# Patient Record
Sex: MALE | Race: WHITE | Employment: UNEMPLOYED | URBAN - METROPOLITAN AREA
[De-identification: names, ages, dates, MRNs, and addresses within clinical notes are randomized per-mention and may not be internally consistent; named-entity substitution may affect disease eponyms.]

---

## 2017-01-15 ENCOUNTER — TRANSFERRED RECORDS (OUTPATIENT)
Dept: HEALTH INFORMATION MANAGEMENT | Facility: CLINIC | Age: 72
End: 2017-01-15

## 2017-01-15 ENCOUNTER — HISTORIC RESULTS (OUTPATIENT)
Dept: ADMINISTRATIVE | Age: 72
End: 2017-01-15

## 2017-01-17 ENCOUNTER — HISTORIC RESULTS (OUTPATIENT)
Dept: ADMINISTRATIVE | Age: 72
End: 2017-01-17

## 2020-11-11 ENCOUNTER — HOSPITAL ENCOUNTER (INPATIENT)
Facility: CLINIC | Age: 75
LOS: 4 days | Discharge: HOME | End: 2020-11-15
Attending: EMERGENCY MEDICINE | Admitting: HOSPITALIST
Payer: COMMERCIAL

## 2020-11-11 ENCOUNTER — APPOINTMENT (OUTPATIENT)
Dept: CT IMAGING | Facility: CLINIC | Age: 75
End: 2020-11-11
Attending: EMERGENCY MEDICINE
Payer: COMMERCIAL

## 2020-11-11 ENCOUNTER — TRANSFERRED RECORDS (OUTPATIENT)
Dept: HEALTH INFORMATION MANAGEMENT | Facility: CLINIC | Age: 75
End: 2020-11-11

## 2020-11-11 DIAGNOSIS — J96.01 ACUTE RESPIRATORY FAILURE WITH HYPOXIA (H): ICD-10-CM

## 2020-11-11 DIAGNOSIS — E53.8 VITAMIN B12 DEFICIENCY (NON ANEMIC): Primary | ICD-10-CM

## 2020-11-11 DIAGNOSIS — Z20.822 SUSPECTED COVID-19 VIRUS INFECTION: ICD-10-CM

## 2020-11-11 DIAGNOSIS — I10 BENIGN ESSENTIAL HYPERTENSION: ICD-10-CM

## 2020-11-11 DIAGNOSIS — E53.8 VITAMIN B12 DEFICIENCY: ICD-10-CM

## 2020-11-11 DIAGNOSIS — I48.91 NEW ONSET ATRIAL FIBRILLATION (H): ICD-10-CM

## 2020-11-11 DIAGNOSIS — E87.1 HYPONATREMIA: ICD-10-CM

## 2020-11-11 DIAGNOSIS — J18.9 PNEUMONIA OF BOTH LOWER LOBES DUE TO INFECTIOUS ORGANISM: ICD-10-CM

## 2020-11-11 LAB
ALBUMIN SERPL-MCNC: 3.4 G/DL (ref 3.4–5)
ALBUMIN UR-MCNC: 100 MG/DL
ALP SERPL-CCNC: 146 U/L (ref 40–150)
ALT SERPL W P-5'-P-CCNC: 104 U/L (ref 0–70)
ANION GAP SERPL CALCULATED.3IONS-SCNC: 10 MMOL/L (ref 3–14)
APPEARANCE UR: CLEAR
AST SERPL W P-5'-P-CCNC: 51 U/L (ref 0–45)
BASE EXCESS BLDV CALC-SCNC: 0.5 MMOL/L
BASOPHILS # BLD AUTO: 0 10E9/L (ref 0–0.2)
BASOPHILS NFR BLD AUTO: 0.1 %
BILIRUB SERPL-MCNC: 1.8 MG/DL (ref 0.2–1.3)
BILIRUB UR QL STRIP: NEGATIVE
BUN SERPL-MCNC: 29 MG/DL (ref 7–30)
CALCIUM SERPL-MCNC: 8.8 MG/DL (ref 8.5–10.1)
CHLORIDE SERPL-SCNC: 90 MMOL/L (ref 94–109)
CO2 SERPL-SCNC: 24 MMOL/L (ref 20–32)
COLOR UR AUTO: YELLOW
CREAT SERPL-MCNC: 1.44 MG/DL (ref 0.66–1.25)
CRP SERPL-MCNC: 160 MG/L (ref 0–8)
D DIMER PPP FEU-MCNC: 5 UG/ML FEU (ref 0–0.5)
DIFFERENTIAL METHOD BLD: ABNORMAL
EOSINOPHIL # BLD AUTO: 0 10E9/L (ref 0–0.7)
EOSINOPHIL NFR BLD AUTO: 0 %
ERYTHROCYTE [DISTWIDTH] IN BLOOD BY AUTOMATED COUNT: 12.9 % (ref 10–15)
GFR SERPL CREATININE-BSD FRML MDRD: 47 ML/MIN/{1.73_M2}
GLUCOSE BLDC GLUCOMTR-MCNC: 153 MG/DL (ref 70–99)
GLUCOSE SERPL-MCNC: 177 MG/DL (ref 70–99)
GLUCOSE UR STRIP-MCNC: NEGATIVE MG/DL
HBA1C MFR BLD: 6.6 % (ref 0–5.6)
HCO3 BLDV-SCNC: 25 MMOL/L (ref 21–28)
HCT VFR BLD AUTO: 29.8 % (ref 40–53)
HGB BLD-MCNC: 10.6 G/DL (ref 13.3–17.7)
HGB UR QL STRIP: ABNORMAL
IMM GRANULOCYTES # BLD: 0 10E9/L (ref 0–0.4)
IMM GRANULOCYTES NFR BLD: 0.3 %
INTERPRETATION ECG - MUSE: NORMAL
IRON SATN MFR SERPL: 7 % (ref 15–46)
IRON SERPL-MCNC: 17 UG/DL (ref 35–180)
KETONES UR STRIP-MCNC: NEGATIVE MG/DL
LACTATE BLD-SCNC: 2.1 MMOL/L (ref 0.7–2)
LEUKOCYTE ESTERASE UR QL STRIP: NEGATIVE
LYMPHOCYTES # BLD AUTO: 0.3 10E9/L (ref 0.8–5.3)
LYMPHOCYTES NFR BLD AUTO: 3 %
MCH RBC QN AUTO: 30.3 PG (ref 26.5–33)
MCHC RBC AUTO-ENTMCNC: 35.6 G/DL (ref 31.5–36.5)
MCV RBC AUTO: 85 FL (ref 78–100)
MONOCYTES # BLD AUTO: 0.9 10E9/L (ref 0–1.3)
MONOCYTES NFR BLD AUTO: 7.7 %
MUCOUS THREADS #/AREA URNS LPF: PRESENT /LPF
NEUTROPHILS # BLD AUTO: 10.1 10E9/L (ref 1.6–8.3)
NEUTROPHILS NFR BLD AUTO: 88.9 %
NITRATE UR QL: NEGATIVE
NRBC # BLD AUTO: 0 10*3/UL
NRBC BLD AUTO-RTO: 0 /100
NT-PROBNP SERPL-MCNC: ABNORMAL PG/ML (ref 0–1800)
O2/TOTAL GAS SETTING VFR VENT: ABNORMAL %
PCO2 BLDV: 39 MM HG (ref 40–50)
PH BLDV: 7.42 PH (ref 7.32–7.43)
PH UR STRIP: 5.5 PH (ref 5–7)
PLATELET # BLD AUTO: 270 10E9/L (ref 150–450)
PO2 BLDV: 31 MM HG (ref 25–47)
POTASSIUM SERPL-SCNC: 3.7 MMOL/L (ref 3.4–5.3)
PROCALCITONIN SERPL-MCNC: 0.72 NG/ML
PROT SERPL-MCNC: 7.5 G/DL (ref 6.8–8.8)
RBC # BLD AUTO: 3.5 10E12/L (ref 4.4–5.9)
RBC #/AREA URNS AUTO: 1 /HPF (ref 0–2)
SARS-COV-2 RNA SPEC QL NAA+PROBE: NORMAL
SODIUM SERPL-SCNC: 124 MMOL/L (ref 133–144)
SOURCE: ABNORMAL
SP GR UR STRIP: 1.03 (ref 1–1.03)
SPECIMEN SOURCE: NORMAL
TIBC SERPL-MCNC: 251 UG/DL (ref 240–430)
TROPONIN I SERPL-MCNC: <0.015 UG/L (ref 0–0.04)
TSH SERPL DL<=0.005 MIU/L-ACNC: 2.12 MU/L (ref 0.4–4)
UROBILINOGEN UR STRIP-MCNC: 2 MG/DL (ref 0–2)
WBC # BLD AUTO: 11.4 10E9/L (ref 4–11)
WBC #/AREA URNS AUTO: 1 /HPF (ref 0–5)

## 2020-11-11 PROCEDURE — 83605 ASSAY OF LACTIC ACID: CPT | Performed by: EMERGENCY MEDICINE

## 2020-11-11 PROCEDURE — 81001 URINALYSIS AUTO W/SCOPE: CPT | Performed by: EMERGENCY MEDICINE

## 2020-11-11 PROCEDURE — U0003 INFECTIOUS AGENT DETECTION BY NUCLEIC ACID (DNA OR RNA); SEVERE ACUTE RESPIRATORY SYNDROME CORONAVIRUS 2 (SARS-COV-2) (CORONAVIRUS DISEASE [COVID-19]), AMPLIFIED PROBE TECHNIQUE, MAKING USE OF HIGH THROUGHPUT TECHNOLOGIES AS DESCRIBED BY CMS-2020-01-R: HCPCS | Performed by: EMERGENCY MEDICINE

## 2020-11-11 PROCEDURE — 83880 ASSAY OF NATRIURETIC PEPTIDE: CPT | Performed by: EMERGENCY MEDICINE

## 2020-11-11 PROCEDURE — 83036 HEMOGLOBIN GLYCOSYLATED A1C: CPT | Performed by: EMERGENCY MEDICINE

## 2020-11-11 PROCEDURE — 99285 EMERGENCY DEPT VISIT HI MDM: CPT | Mod: 25

## 2020-11-11 PROCEDURE — 80053 COMPREHEN METABOLIC PANEL: CPT | Performed by: EMERGENCY MEDICINE

## 2020-11-11 PROCEDURE — 250N000011 HC RX IP 250 OP 636: Performed by: EMERGENCY MEDICINE

## 2020-11-11 PROCEDURE — 87086 URINE CULTURE/COLONY COUNT: CPT | Performed by: EMERGENCY MEDICINE

## 2020-11-11 PROCEDURE — 999N001017 HC STATISTIC GLUCOSE BY METER IP

## 2020-11-11 PROCEDURE — 210N000002 HC R&B HEART CARE

## 2020-11-11 PROCEDURE — 86140 C-REACTIVE PROTEIN: CPT | Performed by: EMERGENCY MEDICINE

## 2020-11-11 PROCEDURE — 84145 PROCALCITONIN (PCT): CPT | Performed by: EMERGENCY MEDICINE

## 2020-11-11 PROCEDURE — 250N000013 HC RX MED GY IP 250 OP 250 PS 637: Performed by: EMERGENCY MEDICINE

## 2020-11-11 PROCEDURE — 96375 TX/PRO/DX INJ NEW DRUG ADDON: CPT

## 2020-11-11 PROCEDURE — 258N000003 HC RX IP 258 OP 636: Performed by: EMERGENCY MEDICINE

## 2020-11-11 PROCEDURE — 99223 1ST HOSP IP/OBS HIGH 75: CPT | Mod: AI | Performed by: HOSPITALIST

## 2020-11-11 PROCEDURE — 250N000013 HC RX MED GY IP 250 OP 250 PS 637: Performed by: HOSPITALIST

## 2020-11-11 PROCEDURE — 84443 ASSAY THYROID STIM HORMONE: CPT | Performed by: EMERGENCY MEDICINE

## 2020-11-11 PROCEDURE — 250N000011 HC RX IP 250 OP 636: Performed by: HOSPITALIST

## 2020-11-11 PROCEDURE — 83550 IRON BINDING TEST: CPT | Performed by: EMERGENCY MEDICINE

## 2020-11-11 PROCEDURE — 87040 BLOOD CULTURE FOR BACTERIA: CPT | Performed by: EMERGENCY MEDICINE

## 2020-11-11 PROCEDURE — 250N000009 HC RX 250: Performed by: EMERGENCY MEDICINE

## 2020-11-11 PROCEDURE — 250N000012 HC RX MED GY IP 250 OP 636 PS 637: Performed by: HOSPITALIST

## 2020-11-11 PROCEDURE — 85379 FIBRIN DEGRADATION QUANT: CPT | Performed by: EMERGENCY MEDICINE

## 2020-11-11 PROCEDURE — 85025 COMPLETE CBC W/AUTO DIFF WBC: CPT | Performed by: EMERGENCY MEDICINE

## 2020-11-11 PROCEDURE — 96374 THER/PROPH/DIAG INJ IV PUSH: CPT | Mod: 59

## 2020-11-11 PROCEDURE — 83540 ASSAY OF IRON: CPT | Performed by: EMERGENCY MEDICINE

## 2020-11-11 PROCEDURE — 258N000003 HC RX IP 258 OP 636: Performed by: HOSPITALIST

## 2020-11-11 PROCEDURE — 82803 BLOOD GASES ANY COMBINATION: CPT | Performed by: EMERGENCY MEDICINE

## 2020-11-11 PROCEDURE — 93005 ELECTROCARDIOGRAM TRACING: CPT

## 2020-11-11 PROCEDURE — 84484 ASSAY OF TROPONIN QUANT: CPT | Performed by: EMERGENCY MEDICINE

## 2020-11-11 PROCEDURE — 71275 CT ANGIOGRAPHY CHEST: CPT

## 2020-11-11 RX ORDER — LOVASTATIN 20 MG
40 TABLET ORAL AT BEDTIME
Status: DISCONTINUED | OUTPATIENT
Start: 2020-11-11 | End: 2020-11-11 | Stop reason: CLARIF

## 2020-11-11 RX ORDER — INSULIN GLARGINE 100 [IU]/ML
20 INJECTION, SOLUTION SUBCUTANEOUS AT BEDTIME
COMMUNITY

## 2020-11-11 RX ORDER — ATENOLOL 50 MG/1
50 TABLET ORAL ONCE
Status: DISCONTINUED | OUTPATIENT
Start: 2020-11-11 | End: 2020-11-11

## 2020-11-11 RX ORDER — LIDOCAINE 40 MG/G
CREAM TOPICAL
Status: DISCONTINUED | OUTPATIENT
Start: 2020-11-11 | End: 2020-11-15 | Stop reason: HOSPADM

## 2020-11-11 RX ORDER — POLYETHYLENE GLYCOL 3350 17 G/17G
17 POWDER, FOR SOLUTION ORAL DAILY PRN
Status: DISCONTINUED | OUTPATIENT
Start: 2020-11-11 | End: 2020-11-15 | Stop reason: HOSPADM

## 2020-11-11 RX ORDER — IRBESARTAN 300 MG/1
300 TABLET ORAL DAILY
COMMUNITY

## 2020-11-11 RX ORDER — AZITHROMYCIN 500 MG/1
500 INJECTION, POWDER, LYOPHILIZED, FOR SOLUTION INTRAVENOUS ONCE
Status: DISCONTINUED | OUTPATIENT
Start: 2020-11-11 | End: 2020-11-11

## 2020-11-11 RX ORDER — GLIPIZIDE 10 MG/1
10 TABLET ORAL
COMMUNITY

## 2020-11-11 RX ORDER — GLIPIZIDE 10 MG/1
10 TABLET, FILM COATED, EXTENDED RELEASE ORAL ONCE
Status: COMPLETED | OUTPATIENT
Start: 2020-11-11 | End: 2020-11-11

## 2020-11-11 RX ORDER — ACETAMINOPHEN 325 MG/1
975 TABLET ORAL EVERY 6 HOURS PRN
Status: DISCONTINUED | OUTPATIENT
Start: 2020-11-11 | End: 2020-11-15 | Stop reason: HOSPADM

## 2020-11-11 RX ORDER — IRBESARTAN 150 MG/1
300 TABLET ORAL DAILY
Status: DISCONTINUED | OUTPATIENT
Start: 2020-11-12 | End: 2020-11-15 | Stop reason: HOSPADM

## 2020-11-11 RX ORDER — SODIUM CHLORIDE 9 MG/ML
INJECTION, SOLUTION INTRAVENOUS CONTINUOUS
Status: DISCONTINUED | OUTPATIENT
Start: 2020-11-11 | End: 2020-11-11

## 2020-11-11 RX ORDER — DEXAMETHASONE SODIUM PHOSPHATE 10 MG/ML
8 INJECTION, SOLUTION INTRAMUSCULAR; INTRAVENOUS ONCE
Status: COMPLETED | OUTPATIENT
Start: 2020-11-11 | End: 2020-11-11

## 2020-11-11 RX ORDER — TAMSULOSIN HYDROCHLORIDE 0.4 MG/1
0.8 CAPSULE ORAL AT BEDTIME
Status: DISCONTINUED | OUTPATIENT
Start: 2020-11-11 | End: 2020-11-15 | Stop reason: HOSPADM

## 2020-11-11 RX ORDER — ATORVASTATIN CALCIUM 10 MG/1
10 TABLET, FILM COATED ORAL EVERY EVENING
Status: DISCONTINUED | OUTPATIENT
Start: 2020-11-11 | End: 2020-11-15 | Stop reason: HOSPADM

## 2020-11-11 RX ORDER — ONDANSETRON 2 MG/ML
4 INJECTION INTRAMUSCULAR; INTRAVENOUS EVERY 6 HOURS PRN
Status: DISCONTINUED | OUTPATIENT
Start: 2020-11-11 | End: 2020-11-15 | Stop reason: HOSPADM

## 2020-11-11 RX ORDER — GLIPIZIDE 10 MG/1
10 TABLET ORAL
Status: DISCONTINUED | OUTPATIENT
Start: 2020-11-12 | End: 2020-11-13

## 2020-11-11 RX ORDER — METOPROLOL TARTRATE 1 MG/ML
2.5 INJECTION, SOLUTION INTRAVENOUS EVERY 4 HOURS PRN
Status: DISCONTINUED | OUTPATIENT
Start: 2020-11-11 | End: 2020-11-15 | Stop reason: HOSPADM

## 2020-11-11 RX ORDER — ALBUTEROL SULFATE 90 UG/1
2 AEROSOL, METERED RESPIRATORY (INHALATION) 4 TIMES DAILY
Status: DISCONTINUED | OUTPATIENT
Start: 2020-11-11 | End: 2020-11-15 | Stop reason: HOSPADM

## 2020-11-11 RX ORDER — GLIPIZIDE 5 MG/1
5 TABLET ORAL EVERY EVENING
COMMUNITY

## 2020-11-11 RX ORDER — BISACODYL 10 MG
10 SUPPOSITORY, RECTAL RECTAL DAILY PRN
Status: DISCONTINUED | OUTPATIENT
Start: 2020-11-11 | End: 2020-11-15 | Stop reason: HOSPADM

## 2020-11-11 RX ORDER — DEXTROSE MONOHYDRATE 25 G/50ML
25-50 INJECTION, SOLUTION INTRAVENOUS
Status: DISCONTINUED | OUTPATIENT
Start: 2020-11-11 | End: 2020-11-15 | Stop reason: HOSPADM

## 2020-11-11 RX ORDER — IRBESARTAN 150 MG/1
300 TABLET ORAL ONCE
Status: DISCONTINUED | OUTPATIENT
Start: 2020-11-11 | End: 2020-11-11

## 2020-11-11 RX ORDER — LOVASTATIN 40 MG
40 TABLET ORAL AT BEDTIME
COMMUNITY

## 2020-11-11 RX ORDER — NICOTINE POLACRILEX 4 MG
15-30 LOZENGE BUCCAL
Status: DISCONTINUED | OUTPATIENT
Start: 2020-11-11 | End: 2020-11-15 | Stop reason: HOSPADM

## 2020-11-11 RX ORDER — CHLORTHALIDONE 50 MG/1
50 TABLET ORAL DAILY
Status: ON HOLD | COMMUNITY
End: 2020-11-15

## 2020-11-11 RX ORDER — METOPROLOL TARTRATE 25 MG/1
25 TABLET, FILM COATED ORAL 2 TIMES DAILY
Status: DISCONTINUED | OUTPATIENT
Start: 2020-11-11 | End: 2020-11-12

## 2020-11-11 RX ORDER — HYDRALAZINE HYDROCHLORIDE 20 MG/ML
10 INJECTION INTRAMUSCULAR; INTRAVENOUS
Status: DISCONTINUED | OUTPATIENT
Start: 2020-11-11 | End: 2020-11-15 | Stop reason: HOSPADM

## 2020-11-11 RX ORDER — GLIPIZIDE 5 MG/1
5 TABLET ORAL EVERY EVENING
Status: DISCONTINUED | OUTPATIENT
Start: 2020-11-12 | End: 2020-11-13

## 2020-11-11 RX ORDER — CEFTRIAXONE 1 G/1
1 INJECTION, POWDER, FOR SOLUTION INTRAMUSCULAR; INTRAVENOUS EVERY 24 HOURS
Status: DISCONTINUED | OUTPATIENT
Start: 2020-11-12 | End: 2020-11-15 | Stop reason: HOSPADM

## 2020-11-11 RX ORDER — ONDANSETRON 4 MG/1
4 TABLET, ORALLY DISINTEGRATING ORAL EVERY 6 HOURS PRN
Status: DISCONTINUED | OUTPATIENT
Start: 2020-11-11 | End: 2020-11-15 | Stop reason: HOSPADM

## 2020-11-11 RX ORDER — IOPAMIDOL 755 MG/ML
80 INJECTION, SOLUTION INTRAVASCULAR ONCE
Status: COMPLETED | OUTPATIENT
Start: 2020-11-11 | End: 2020-11-11

## 2020-11-11 RX ORDER — AZITHROMYCIN 250 MG/1
250 TABLET, FILM COATED ORAL DAILY
Status: COMPLETED | OUTPATIENT
Start: 2020-11-12 | End: 2020-11-15

## 2020-11-11 RX ORDER — ATENOLOL 50 MG/1
50 TABLET ORAL DAILY
COMMUNITY

## 2020-11-11 RX ORDER — TAMSULOSIN HYDROCHLORIDE 0.4 MG/1
0.8 CAPSULE ORAL AT BEDTIME
COMMUNITY

## 2020-11-11 RX ORDER — CEFTRIAXONE 2 G/1
2 INJECTION, POWDER, FOR SOLUTION INTRAMUSCULAR; INTRAVENOUS ONCE
Status: COMPLETED | OUTPATIENT
Start: 2020-11-11 | End: 2020-11-11

## 2020-11-11 RX ORDER — ACETAMINOPHEN 500 MG
1000 TABLET ORAL ONCE
Status: COMPLETED | OUTPATIENT
Start: 2020-11-11 | End: 2020-11-11

## 2020-11-11 RX ADMIN — ACETAMINOPHEN 975 MG: 325 TABLET, FILM COATED ORAL at 23:37

## 2020-11-11 RX ADMIN — GLIPIZIDE 10 MG: 10 TABLET, FILM COATED, EXTENDED RELEASE ORAL at 17:02

## 2020-11-11 RX ADMIN — METOPROLOL TARTRATE 25 MG: 25 TABLET, FILM COATED ORAL at 21:56

## 2020-11-11 RX ADMIN — CEFTRIAXONE SODIUM 2 G: 2 INJECTION, POWDER, FOR SOLUTION INTRAMUSCULAR; INTRAVENOUS at 19:50

## 2020-11-11 RX ADMIN — SODIUM CHLORIDE: 9 INJECTION, SOLUTION INTRAVENOUS at 21:56

## 2020-11-11 RX ADMIN — ATORVASTATIN CALCIUM 10 MG: 10 TABLET, FILM COATED ORAL at 21:56

## 2020-11-11 RX ADMIN — SODIUM CHLORIDE 500 ML: 9 INJECTION, SOLUTION INTRAVENOUS at 17:02

## 2020-11-11 RX ADMIN — TAMSULOSIN HYDROCHLORIDE 0.8 MG: 0.4 CAPSULE ORAL at 21:56

## 2020-11-11 RX ADMIN — SODIUM CHLORIDE 100 ML: 9 INJECTION, SOLUTION INTRAVENOUS at 17:11

## 2020-11-11 RX ADMIN — INSULIN GLARGINE 10 UNITS: 100 INJECTION, SOLUTION SUBCUTANEOUS at 22:48

## 2020-11-11 RX ADMIN — IOPAMIDOL 80 ML: 755 INJECTION, SOLUTION INTRAVENOUS at 17:11

## 2020-11-11 RX ADMIN — APIXABAN 5 MG: 5 TABLET, FILM COATED ORAL at 21:56

## 2020-11-11 RX ADMIN — DEXAMETHASONE SODIUM PHOSPHATE 8 MG: 10 INJECTION, SOLUTION INTRAMUSCULAR; INTRAVENOUS at 19:49

## 2020-11-11 RX ADMIN — HYDRALAZINE HYDROCHLORIDE 10 MG: 20 INJECTION INTRAMUSCULAR; INTRAVENOUS at 21:55

## 2020-11-11 RX ADMIN — ACETAMINOPHEN 1000 MG: 500 TABLET, FILM COATED ORAL at 15:30

## 2020-11-11 ASSESSMENT — ENCOUNTER SYMPTOMS
SHORTNESS OF BREATH: 1
VOMITING: 0
NAUSEA: 0
FEVER: 1
DIARRHEA: 0
COUGH: 1
PALPITATIONS: 0
FATIGUE: 1

## 2020-11-11 ASSESSMENT — MIFFLIN-ST. JEOR: SCORE: 1915.6

## 2020-11-11 NOTE — ED NOTES
[facility]  ED Nurse Handoff Report    ED Chief complaint: Shortness of Breath      ED Diagnosis:   Final diagnoses:   None       Code Status: Not addressed, confirm with hospitalist    Allergies:   Allergies   Allergen Reactions     Lisinopril Anaphylaxis and Cough     Simvastatin Anaphylaxis and Difficulty breathing       Patient Story: Pt sent from clinic due to shortness of breath and hypoxia.   Focused Assessment:  Pt sats down to 87% with talking or activity. O2 started at 2L/min per nc and sats remain >92%. Pt alert and oriented, pleasant and cooperative. Denies chest pain. D-dimer and BNP elevated (see labs). CT pending.       Treatments and/or interventions provided: Labs, CT   Labs Ordered and Resulted from Time of ED Arrival Up to the Time of Departure from the ED   CBC WITH PLATELETS DIFFERENTIAL - Abnormal; Notable for the following components:       Result Value    WBC 11.4 (*)     RBC Count 3.50 (*)     Hemoglobin 10.6 (*)     Hematocrit 29.8 (*)     Absolute Neutrophil 10.1 (*)     Absolute Lymphocytes 0.3 (*)     All other components within normal limits   COMPREHENSIVE METABOLIC PANEL - Abnormal; Notable for the following components:    Sodium 124 (*)     Chloride 90 (*)     Glucose 177 (*)     Creatinine 1.44 (*)     GFR Estimate 47 (*)     GFR Estimate If Black 54 (*)     Bilirubin Total 1.8 (*)      (*)     AST 51 (*)     All other components within normal limits   NT PROBNP INPATIENT - Abnormal; Notable for the following components:    N-Terminal Pro BNP Inpatient 11,084 (*)     All other components within normal limits   BLOOD GAS VENOUS - Abnormal; Notable for the following components:    PCO2 Venous 39 (*)     All other components within normal limits   D DIMER QUANTITATIVE - Abnormal; Notable for the following components:    D Dimer 5.0 (*)     All other components within normal limits   CRP INFLAMMATION - Abnormal; Notable for the following components:    CRP  Inflammation 160.0 (*)     All other components within normal limits   LACTIC ACID WHOLE BLOOD - Abnormal; Notable for the following components:    Lactic Acid 2.1 (*)     All other components within normal limits   TROPONIN I   PROCALCITONIN   COVID-19 VIRUS (CORONAVIRUS) BY PCR   ROUTINE UA WITH MICROSCOPIC   PULSE OXIMETRY NURSING   BLOOD CULTURE   BLOOD CULTURE   URINE CULTURE AEROBIC BACTERIAL     CT Chest Pulmonary Embolism w Contrast    (Results Pending)       Patient's response to treatments and/or interventions: Tolerated well    To be done/followed up on inpatient unit:  Continue plan of care (UA still needed at time of note)    Does this patient have any cognitive concerns?: None noted    Activity level - Baseline/Home:  Unknown  Activity Level - Current:   Unknown    Patient's Preferred language: English   Needed?: No    Isolation: None and COVID r/o and special precautions  Infection: Not Applicable  COVID r/o and special precautions  Patient tested for COVID 19 prior to admission: YES  Bariatric?: No    Vital Signs:   Vitals:    11/11/20 1600 11/11/20 1615 11/11/20 1630 11/11/20 1645   BP: (!) 156/85 (!) 170/75 (!) 142/72 (!) 152/63   Pulse: 64 72 59 57   Resp: 29 13 15 22   Temp:       TempSrc:       SpO2: 94% 94% 92% 95%   Weight:       Height:           Cardiac Rhythm:     Was the PSS-3 completed:   Yes  What interventions are required if any?               Family Comments: Not present  OBS brochure/video discussed/provided to patient/family: No              Name of person given brochure if not patient: n/a              Relationship to patient: n/a    For the majority of the shift this patient's behavior was Green.   Behavioral interventions performed were Frequent rounding.    ED NURSE PHONE NUMBER: *[id number]  RECEIVING UNIT ED HANDOFF REVIEW    ED Nurse Handoff Report was reviewed by: Dallin Teixeira RN on November 11, 2020 at 7:56 PM

## 2020-11-11 NOTE — ED PROVIDER NOTES
History   Chief Complaint:    The history is provided by the patient and the EMS personnel.      Dallin Mckeon is a 75 year old male with history of type 2 diabetes, hypertension, obesity, hyperlipidemia, hypertension, CKD stage 2 who presents for evaluation of shortness of breath, fever, and cough and arrives via EMS.  The patient notes that for the past 2-3 days he has had a cough, low enegry and feeling feverish. He notes that his shortness of breath has been progressing and today was getting worse. He states that his wife had then brought him in for evaluation at [facility] urgent care and noted to have a fever of 101.8. They also found that he had new onset atrial fibrillation and hypertension and was transferred here. He denies taking any of his medications today including his antihypertensives and that he last took tylenol around 0530. He reports that he has an extensive caridac history and diabetes. He denies changes in his taste and smell or known COVID exposures as he lives at home with his wife. He denies leg swelling or pain, chest pain, emesis or diarrhea.     Allergies:  Lisinopril   Simvastatin     Medications:   Aspirin 81 mg   Lancets  Lantus  Atenolol   Glipizide   Irbesartan   Metformin   Percocet  Gabapentin   Flomax     Past Medical History:    CKD stage 2   begning prostatic hyperplasia  Type 2 diabetes  Morbid obesity   Hyperlipidemia   Hypertension   ANGELITA on CPAP    Past Surgical History:    CABG  Mohs surgery  Cataract implants bilateral    Family History:    Cataract, father   Diabetes, father   Hypertension, father   Stroke, mother   Cardiovascular disease, mother   Hypertension, mother     Social History:  Smoking status: never smoker  Alcohol use: no  Drug use: no  PCP: JENNIFER Sutton CNP   Presents to the ED via EMS  Up to date on immunization     Review of Systems   Constitutional: Positive for fatigue and fever.   Respiratory: Positive for cough and shortness of breath.   "  Cardiovascular: Negative for chest pain, palpitations and leg swelling.   Gastrointestinal: Negative for diarrhea, nausea and vomiting.   All other systems reviewed and are negative.    Physical Exam     Patient Vitals for the past 24 hrs:   BP Temp Temp src Pulse Resp SpO2 Height Weight   11/11/20 1800 (!) 170/67 -- -- 53 24 97 % -- --   11/11/20 1730 (!) 145/66 -- -- 62 22 97 % -- --   11/11/20 1645 (!) 152/63 -- -- 57 22 95 % -- --   11/11/20 1630 (!) 142/72 -- -- 59 15 92 % -- --   11/11/20 1615 (!) 170/75 -- -- 72 13 94 % -- --   11/11/20 1600 (!) 156/85 -- -- 64 29 94 % -- --   11/11/20 1530 (!) 170/85 -- -- 67 26 96 % -- --   11/11/20 1525 (!) 179/71 -- -- 63 (!) 34 96 % -- --   11/11/20 1512 (!) 212/112 98.8  F (37.1  C) Oral 65 16 96 % 1.778 m (5' 10\") 117.9 kg (260 lb)   11/11/20 1500 -- -- -- -- -- (!) 87 % -- --       Physical Exam  Nursing note and vitals reviewed.  Constitutional:  Labored breathing, high BMI.   HENT:   Head:    Atraumatic.   Mouth/Throat:   Oropharynx is clear and moist. No oropharyngeal exudate.   Eyes:    Pupils are equal, round, and reactive to light.   Neck:    Normal range of motion. Neck supple.      No tracheal deviation present. No thyromegaly present.   Cardiovascular:  Regular rate irregular irregular rhythm. no murmur   Pulmonary/Chest: Bibasilar crackles.   Abdominal:   Soft. Bowel sounds are normal. Exhibits no distension and      no mass. There is no tenderness.      There is no rebound and no guarding.   Musculoskeletal:  Trace pretibial edema bilaterally.  Lymphadenopathy:  No cervical adenopathy.   Neurological:   Alert and oriented to person, place, and time.   Skin:    Skin is warm and dry. No rash noted. No pallor.       Emergency Department Course     ECG:  ECG taken at 1516, ECG read at 1522  Atrial fibrillation  Low voltage QRS  Abnormal ECG  Rate 70 bpm. NH interval * ms. QRS duration 108 ms. QT/QTc 444/479 ms. P-R-T axes * -1 44.    Imaging:  Radiology " findings were communicated with the patient who voiced understanding of the findings.    CT Chest PE:  1. No pulmonary embolism demonstrated.   2. Moderate to severe interstitial and groundglass infiltrates which are nonspecific.   3. Trace pleural fluid bilaterally.     Reading per radiology      Laboratory:  Laboratory findings were communicated with the patient who voiced understanding of the findings.    CBC: WBC 11.4(H), HGB 10.6(L),   CMP: (L), Chloride 90(L), Glucose 177(L), GFR 47(L), Creatinine 1.44(H), total bilirubin 1.8(H), (H), Ast 51(H)  Troponin (Collected 1622): <0.015   BNP: 11,084(H)   VBG: pH: 7.42, PCO2: 39(L), PO2: 31, Bicarbonate: 25, O2 Sat: 2 liters, base excess 0.5   D Dimer: 5.0(H)  Lactic Acid: 2.1(H)   CRP inflammation: 160.0(H)  Procalcitonin: 0.72      Blood cultures: pending     COVID-19 virus Swab PCR: pending      Interventions:  1530 Acetaminophen, 1000 mg, PO  1702 Glipizide, 10 mg, PO  1702 NS 1L IV Bolus   19:49 Dexamethasone 8 mg, IV  19:50 Rocephin, 2 g, IV injection    Azithromycin, 500 mg, IV   Irbesartan, 2 tablets at 250 mg, PO        Emergency Department Course:   Nursing notes and vitals reviewed.  A Nasopharyngeal swab was obtained here in the ED.    1510 I performed an exam of the patient as documented above.     1531 IV was inserted and blood was drawn for laboratory testing, results above.     1712 The patient had a CT PE while in the emergency department, results above.       1823 I personally reviewed the results with the patient and answered all related questions prior to admission.    1826  I spoke to Dr. Marti of the hospitalist service who accepts the patient for admission.      1843 I called the patient's wife and updated her on the patient and his plan of care going forward.     Findings and plan explained to the Patient who consents to admission. Discussed the patient with Dr. Marti , who will admit the patient to a observation bed for  further monitoring, evaluation, and treatment.      Impression & Plan      Medical Decision Making:  Dallin Mckeon is a 75 year old male who presents to the emergency department today for evaluation of shortness of breath. I found the patient to have acute hypoxic respiratory failure with bilateral lower lobe ground glass infiltrates concerning for acute COVID pneumonia and septic. He was covered for community acquired pneumonia with azithromycin, ceftriaxone, and steroids for possible COVID pneumonia. He was keep on supplemental oxygen and given cautious IV fluids due to the congestive heart failure with new onset atrial fibrillation which was rate controlled. He was also found to hyponatremia which is improved with IV fluids. There was no sign of pulmonary embolism on CT. Patient was admitted into the care of the hospitalist Dr. Marti. I spoke with and updated the patient's wife.     Diagnosis:    ICD-10-CM    1. Acute respiratory failure with hypoxia (H)  J96.01 Blood culture     Blood culture   2. Pneumonia of both lower lobes due to infectious organism  J18.9    3. Suspected COVID-19 virus infection  Z20.828    4. New onset atrial fibrillation (H)  I48.91    5. Hyponatremia  E87.1    6. Benign essential hypertension  I10        Disposition:   The patient is admitted into the care of Dr. Marti .     Scribe Disclosure:  I, Leatha Matthews, am serving as a scribe at 2:59 PM on 11/11/2020 to document services personally performed by Sowmya Tse MD based on my observations and the provider's statements to me.  [facility] EMERGENCY DEPARTMENT         Sowmya Tse MD  11/11/20 [id number]

## 2020-11-11 NOTE — PHARMACY-ADMISSION MEDICATION HISTORY
Pharmacy Medication History  Admission medication history interview status for the 11/11/2020  admission is complete. See EPIC admission navigator for prior to admission medications       Medication history sources: Patient  Location of interview: Phone  Medication history source reliability: Good  Adherence assessment: Good    Significant changes made to the medication list:  None      Additional medication history information:   None    Medication reconciliation completed by provider prior to medication history? No    Time spent in this activity: 15 min      Prior to Admission medications    Medication Sig Last Dose Taking? Auth Provider   atenolol (TENORMIN) 50 MG tablet Take 50 mg by mouth daily 11/10/2020 at Unknown time Yes Unknown, Entered By History   chlorthalidone (HYGROTON) 50 MG tablet Take 50 mg by mouth daily 11/10/2020 at Unknown time Yes Unknown, Entered By History   glipiZIDE (GLUCOTROL) 10 MG tablet Take 10 mg by mouth daily before breakfast 11/10/2020 at Unknown time Yes Unknown, Entered By History   glipiZIDE (GLUCOTROL) 5 MG tablet Take 5 mg by mouth every evening 11/10/2020 at Unknown time Yes Unknown, Entered By History   insulin glargine (LANTUS VIAL) 100 UNIT/ML vial Inject 20 Units Subcutaneous At Bedtime 11/10/2020 at Unknown time Yes Unknown, Entered By History   irbesartan (AVAPRO) 300 MG tablet Take 300 mg by mouth daily 11/10/2020 at Unknown time Yes Unknown, Entered By History   lovastatin (MEVACOR) 40 MG tablet Take 40 mg by mouth At Bedtime 11/10/2020 at Unknown time Yes Unknown, Entered By History   metFORMIN (GLUCOPHAGE) 1000 MG tablet Take 1,000 mg by mouth 2 times daily (with meals) 11/10/2020 at Unknown time Yes Unknown, Entered By History   tamsulosin (FLOMAX) 0.4 MG capsule Take 0.8 mg by mouth At Bedtime 11/10/2020 at Unknown time Yes Unknown, Entered By History

## 2020-11-11 NOTE — ED NOTES
Bed: ED02  Expected date: 11/11/20  Expected time:   Means of arrival:   Comments:  [place] - 75 M weakness SOB eta 1795

## 2020-11-12 ENCOUNTER — APPOINTMENT (OUTPATIENT)
Dept: OCCUPATIONAL THERAPY | Facility: CLINIC | Age: 75
End: 2020-11-12
Attending: HOSPITALIST
Payer: COMMERCIAL

## 2020-11-12 LAB
ANION GAP SERPL CALCULATED.3IONS-SCNC: 6 MMOL/L (ref 3–14)
ANION GAP SERPL CALCULATED.3IONS-SCNC: 8 MMOL/L (ref 3–14)
BACTERIA SPEC CULT: NO GROWTH
BASOPHILS # BLD AUTO: 0 10E9/L (ref 0–0.2)
BASOPHILS NFR BLD AUTO: 0 %
BUN SERPL-MCNC: 29 MG/DL (ref 7–30)
BUN SERPL-MCNC: 31 MG/DL (ref 7–30)
CALCIUM SERPL-MCNC: 8.5 MG/DL (ref 8.5–10.1)
CALCIUM SERPL-MCNC: 8.8 MG/DL (ref 8.5–10.1)
CHLORIDE SERPL-SCNC: 89 MMOL/L (ref 94–109)
CHLORIDE SERPL-SCNC: 90 MMOL/L (ref 94–109)
CO2 SERPL-SCNC: 25 MMOL/L (ref 20–32)
CO2 SERPL-SCNC: 27 MMOL/L (ref 20–32)
CREAT SERPL-MCNC: 1.22 MG/DL (ref 0.66–1.25)
CREAT SERPL-MCNC: 1.33 MG/DL (ref 0.66–1.25)
DIFFERENTIAL METHOD BLD: ABNORMAL
EOSINOPHIL # BLD AUTO: 0 10E9/L (ref 0–0.7)
EOSINOPHIL NFR BLD AUTO: 0 %
ERYTHROCYTE [DISTWIDTH] IN BLOOD BY AUTOMATED COUNT: 12.9 % (ref 10–15)
FERRITIN SERPL-MCNC: 331 NG/ML (ref 26–388)
GFR SERPL CREATININE-BSD FRML MDRD: 51 ML/MIN/{1.73_M2}
GFR SERPL CREATININE-BSD FRML MDRD: 57 ML/MIN/{1.73_M2}
GLUCOSE BLDC GLUCOMTR-MCNC: 175 MG/DL (ref 70–99)
GLUCOSE BLDC GLUCOMTR-MCNC: 236 MG/DL (ref 70–99)
GLUCOSE BLDC GLUCOMTR-MCNC: 268 MG/DL (ref 70–99)
GLUCOSE BLDC GLUCOMTR-MCNC: 276 MG/DL (ref 70–99)
GLUCOSE SERPL-MCNC: 216 MG/DL (ref 70–99)
GLUCOSE SERPL-MCNC: 285 MG/DL (ref 70–99)
HCT VFR BLD AUTO: 28.5 % (ref 40–53)
HGB BLD-MCNC: 9.8 G/DL (ref 13.3–17.7)
IMM GRANULOCYTES # BLD: 0 10E9/L (ref 0–0.4)
IMM GRANULOCYTES NFR BLD: 0.2 %
LABORATORY COMMENT REPORT: NORMAL
LYMPHOCYTES # BLD AUTO: 0.4 10E9/L (ref 0.8–5.3)
LYMPHOCYTES NFR BLD AUTO: 7.4 %
Lab: NORMAL
MCH RBC QN AUTO: 29.3 PG (ref 26.5–33)
MCHC RBC AUTO-ENTMCNC: 34.4 G/DL (ref 31.5–36.5)
MCV RBC AUTO: 85 FL (ref 78–100)
MONOCYTES # BLD AUTO: 0.4 10E9/L (ref 0–1.3)
MONOCYTES NFR BLD AUTO: 7.4 %
NEUTROPHILS # BLD AUTO: 5 10E9/L (ref 1.6–8.3)
NEUTROPHILS NFR BLD AUTO: 85 %
NRBC # BLD AUTO: 0 10*3/UL
NRBC BLD AUTO-RTO: 0 /100
OSMOLALITY UR: 462 MMOL/KG (ref 100–1200)
PLATELET # BLD AUTO: 256 10E9/L (ref 150–450)
POTASSIUM SERPL-SCNC: 3.6 MMOL/L (ref 3.4–5.3)
POTASSIUM SERPL-SCNC: 3.8 MMOL/L (ref 3.4–5.3)
RBC # BLD AUTO: 3.34 10E12/L (ref 4.4–5.9)
SARS-COV-2 RNA SPEC QL NAA+PROBE: NEGATIVE
SODIUM SERPL-SCNC: 122 MMOL/L (ref 133–144)
SODIUM SERPL-SCNC: 123 MMOL/L (ref 133–144)
SODIUM UR-SCNC: 7 MMOL/L
SPECIMEN SOURCE: NORMAL
SPECIMEN SOURCE: NORMAL
WBC # BLD AUTO: 5.9 10E9/L (ref 4–11)

## 2020-11-12 PROCEDURE — 250N000013 HC RX MED GY IP 250 OP 250 PS 637: Performed by: HOSPITALIST

## 2020-11-12 PROCEDURE — 250N000012 HC RX MED GY IP 250 OP 636 PS 637: Performed by: HOSPITALIST

## 2020-11-12 PROCEDURE — 999N001017 HC STATISTIC GLUCOSE BY METER IP

## 2020-11-12 PROCEDURE — 83935 ASSAY OF URINE OSMOLALITY: CPT | Performed by: INTERNAL MEDICINE

## 2020-11-12 PROCEDURE — 97165 OT EVAL LOW COMPLEX 30 MIN: CPT | Mod: GO | Performed by: OCCUPATIONAL THERAPIST

## 2020-11-12 PROCEDURE — 80048 BASIC METABOLIC PNL TOTAL CA: CPT | Performed by: HOSPITALIST

## 2020-11-12 PROCEDURE — 250N000011 HC RX IP 250 OP 636: Performed by: HOSPITALIST

## 2020-11-12 PROCEDURE — 85025 COMPLETE CBC W/AUTO DIFF WBC: CPT | Performed by: HOSPITALIST

## 2020-11-12 PROCEDURE — 80048 BASIC METABOLIC PNL TOTAL CA: CPT | Performed by: INTERNAL MEDICINE

## 2020-11-12 PROCEDURE — 250N000011 HC RX IP 250 OP 636: Performed by: INTERNAL MEDICINE

## 2020-11-12 PROCEDURE — 82728 ASSAY OF FERRITIN: CPT | Performed by: HOSPITALIST

## 2020-11-12 PROCEDURE — 210N000002 HC R&B HEART CARE

## 2020-11-12 PROCEDURE — 84300 ASSAY OF URINE SODIUM: CPT | Performed by: INTERNAL MEDICINE

## 2020-11-12 PROCEDURE — 97535 SELF CARE MNGMENT TRAINING: CPT | Mod: GO | Performed by: OCCUPATIONAL THERAPIST

## 2020-11-12 PROCEDURE — 99233 SBSQ HOSP IP/OBS HIGH 50: CPT | Performed by: INTERNAL MEDICINE

## 2020-11-12 PROCEDURE — 36415 COLL VENOUS BLD VENIPUNCTURE: CPT | Performed by: INTERNAL MEDICINE

## 2020-11-12 PROCEDURE — 250N000012 HC RX MED GY IP 250 OP 636 PS 637: Performed by: INTERNAL MEDICINE

## 2020-11-12 PROCEDURE — 258N000003 HC RX IP 258 OP 636: Performed by: INTERNAL MEDICINE

## 2020-11-12 PROCEDURE — 36415 COLL VENOUS BLD VENIPUNCTURE: CPT | Performed by: HOSPITALIST

## 2020-11-12 PROCEDURE — 250N000013 HC RX MED GY IP 250 OP 250 PS 637: Performed by: INTERNAL MEDICINE

## 2020-11-12 RX ORDER — POTASSIUM CHLORIDE 1.5 G/1.58G
20 POWDER, FOR SOLUTION ORAL 2 TIMES DAILY
Status: COMPLETED | OUTPATIENT
Start: 2020-11-12 | End: 2020-11-13

## 2020-11-12 RX ADMIN — GLIPIZIDE 5 MG: 5 TABLET ORAL at 21:12

## 2020-11-12 RX ADMIN — ATORVASTATIN CALCIUM 10 MG: 10 TABLET, FILM COATED ORAL at 21:12

## 2020-11-12 RX ADMIN — POTASSIUM CHLORIDE 20 MEQ: 1.5 POWDER, FOR SOLUTION ORAL at 21:13

## 2020-11-12 RX ADMIN — AZITHROMYCIN MONOHYDRATE 250 MG: 250 TABLET ORAL at 09:51

## 2020-11-12 RX ADMIN — CEFTRIAXONE SODIUM 1 G: 1 INJECTION, POWDER, FOR SOLUTION INTRAMUSCULAR; INTRAVENOUS at 21:03

## 2020-11-12 RX ADMIN — GLIPIZIDE 10 MG: 10 TABLET ORAL at 06:58

## 2020-11-12 RX ADMIN — INSULIN ASPART 2 UNITS: 100 INJECTION, SOLUTION INTRAVENOUS; SUBCUTANEOUS at 18:00

## 2020-11-12 RX ADMIN — POTASSIUM CHLORIDE 20 MEQ: 1.5 POWDER, FOR SOLUTION ORAL at 14:19

## 2020-11-12 RX ADMIN — ALBUTEROL SULFATE 2 PUFF: 90 AEROSOL, METERED RESPIRATORY (INHALATION) at 18:00

## 2020-11-12 RX ADMIN — INSULIN GLARGINE 15 UNITS: 100 INJECTION, SOLUTION SUBCUTANEOUS at 21:55

## 2020-11-12 RX ADMIN — IRON SUCROSE 200 MG: 20 INJECTION, SOLUTION INTRAVENOUS at 16:05

## 2020-11-12 RX ADMIN — MAGNESIUM SULFATE HEPTAHYDRATE 1 G: 500 INJECTION, SOLUTION INTRAMUSCULAR; INTRAVENOUS at 22:33

## 2020-11-12 RX ADMIN — INSULIN ASPART 3 UNITS: 100 INJECTION, SOLUTION INTRAVENOUS; SUBCUTANEOUS at 13:28

## 2020-11-12 RX ADMIN — MAGNESIUM SULFATE HEPTAHYDRATE 1 G: 500 INJECTION, SOLUTION INTRAMUSCULAR; INTRAVENOUS at 14:19

## 2020-11-12 RX ADMIN — APIXABAN 5 MG: 5 TABLET, FILM COATED ORAL at 21:13

## 2020-11-12 RX ADMIN — TAMSULOSIN HYDROCHLORIDE 0.8 MG: 0.4 CAPSULE ORAL at 21:53

## 2020-11-12 RX ADMIN — ALBUTEROL SULFATE 2 PUFF: 90 AEROSOL, METERED RESPIRATORY (INHALATION) at 13:33

## 2020-11-12 RX ADMIN — APIXABAN 5 MG: 5 TABLET, FILM COATED ORAL at 09:51

## 2020-11-12 RX ADMIN — IRBESARTAN 300 MG: 150 TABLET ORAL at 09:51

## 2020-11-12 RX ADMIN — ALBUTEROL SULFATE 2 PUFF: 90 AEROSOL, METERED RESPIRATORY (INHALATION) at 21:14

## 2020-11-12 RX ADMIN — SODIUM CHLORIDE 500 ML: 9 INJECTION, SOLUTION INTRAVENOUS at 12:43

## 2020-11-12 ASSESSMENT — ACTIVITIES OF DAILY LIVING (ADL)
ADLS_ACUITY_SCORE: 17
ADLS_ACUITY_SCORE: 20
ADLS_ACUITY_SCORE: 17
ADLS_ACUITY_SCORE: 19
ADLS_ACUITY_SCORE: 17
ADLS_ACUITY_SCORE: 16

## 2020-11-12 ASSESSMENT — MIFFLIN-ST. JEOR: SCORE: 2014.49

## 2020-11-12 NOTE — CONSULTS
"Consult Date:  11/12/2020      REQUESTING PHYSICIAN:  Dr. Pfeiffer.      CONSULTANT:  Darrell Palmer MD.      REASON FOR CONSULTATION:  Hyponatremia.      HISTORY OF PRESENT ILLNESS:  This is a 75-year-old who has a history of mild chronic kidney disease, stage 2/3.  He was admitted on 11/11 for rule out COVID-19.  He complained of cough, fatigue, myalgias and fevers and a CT scan showed possible bilateral ground-glass infiltrates.  He was treated with antibiotics and Decadron.  A CT scan of the chest was done, as noted above, with IV contrast.  He had new onset of AFib.  He had no loss of taste or smell and no nausea, vomiting or diarrhea.  He tells me he was eating relatively well.  His sodium was noted to be 124 and then dropped to 122 today.  Prior to admission, he was on irbesartan, chlorthalidone, atenolol, metformin, insulin and Flomax.  His other medications are as listed.      Today, he feels improved.  He is not on oxygen.  He feels like he is getting enough air.  He has no chest or abdominal pain.  He is not very mobile, but he is able to sit up in bed and sit in a chair.  He does not have any nausea, vomiting, diarrhea, or abdominal pain.      He tells me he has a history of hyponatremia and has been on \"salt pills\" in the past.  He has no history of hypothyroidism.      PAST MEDICAL HISTORY:  Includes diabetes, hypertension, obstructive sleep apnea on CPAP, BPH and obesity.  His treatment in the hospital so far has been as noted above with saline infusion at low level and antibiotics.  He also got some Decadron.      CURRENT MEDICATIONS:  Include:   1.  Apixaban.     2.  Zithromax.     3.  Rocephin.     4.  Irbesartan.     5.  Metoprolol.      SOCIAL HISTORY:  He is .  He is retired.  He lives in [facility].  He does not smoke.      FAMILY HISTORY:  Noncontributory.      REVIEW OF SYSTEMS:  Negative except as noted above.      PHYSICAL EXAMINATION:   VITAL SIGNS:  His weight is 127.8 " kilograms today.  Pulse is 61, blood pressure 139/70, respiratory rate 18.  Urine output so far today 200 mL.   GENERAL:  He is obese.  He is alert and responsive.  He moves easily around the bed.   LUNGS:  Show clear breath sounds.  I did not hear any rales or wheezes.   CARDIAC:  Regular rhythm, normal S1, S2, no murmur.   ABDOMEN:  Obese.   EXTREMITIES:  Show normal nails, joints and pulses in the upper extremities.  Lower extremities show no edema, but diminished pulses in the feet.   HEENT:  Head shows no trauma.  The eyes show pupils round and reactive to light.  He is wearing a mask.  He is not on oxygen.      LABORATORY EVALUATIONS:  today, his sodium is 122, but this is corrected to 124 for high glucose.  Potassium 3.8, bicarbonate 27, creatinine 1.33.  His urine sodium was very low at 7.  His urine osmolality was high at 462.  Urinalysis showed a specific gravity 1.027.  He had 100 of protein, trace blood, 1 white cell, 1 red cell.  His TSH was normal at 2.12.  His CT scan showed normal kidneys bilaterally.      IMPRESSION:   1.  Chronic kidney disease, stage 2/3.  This is associated with diabetes, hypertension and obesity.  He did get IV contrast with a CT scan.  I do not see any other nephrotoxins.  We will follow his renal function closely.   2.  Hyponatremia.  This is apparently a chronic condition per the patient.  His current sodium is 124.  We will replace his potassium and magnesium.  We will give him another small bolus of normal saline at 500 mL.  We will check laboratories again in the morning including a plasma osmolality.  His thyroid function tests is normal.  His creatinine test is mildly elevated, but not enough to cause hyponatremia on its own.  He has a low urine sodium with a high urine osmolality and this could be consistent with simple prerenal azotemia.  Obviously preadmission thiazide diuretic contributes to his hyponatremia.  We will stop this.   3.  Anemia.  He is iron deficient.   We will give him Venofer and we will check B12 and folate levels.         UNIQUE ROY MD             D: 2020   T: 2020   MT: HALINA      Name:     JOY ERICKSON   MRN:      [id number]        Account:       [id number]   :      1945           Consult Date:  2020      Document: [id number]

## 2020-11-12 NOTE — PROVIDER NOTIFICATION
On call hospitalist contacted about pt's increased O2 needs and whether he needs to be on the IV fluids. MD to discharge fluids and recheck labs earlier.

## 2020-11-12 NOTE — PROGRESS NOTES
[facility]    Medicine Progress Note - Hospitalist Service       Date of Admission:  11/11/2020  Assessment & Plan       Dallin Mckeon is a 75 year old male with medical history significant for DM 2, HTN, HL, ANGELITA, BPH, CKD stage II, morbid obesity was sent to the ER from urgent care for evaluation of above symptoms.  Patient was febrile and hypoxic and is being admitted on 11/11/2020 for possible COVID-19/pneumonia.     Bilateral pneumonia  R/O COVID-19 PNA  Hypoxic respiratory failure  Patient with dry cough, myalgia, fatigue, dyspnea and hypoxia and fever of 101.8.  CT chest shows bilateral groundglass opacity.  Highly concerning for COVID-19.  His procalcitonin is also mildly elevated at 0.72.  Mild leukocytosis -WBC 11.4.  Inflammatory markers including CRP as well as LFTs elevated. Lactic acid borderline 2.1.  - started on ceftriaxone/Zithromax on admission; also received Decadron on admission given concern for COVID-19 infection  - proBNP elevated on admission 67432 but he does not look overt fluid overload;  - Echo pending  - supplemental O2, IS, tylenol prn  - doing better today, SOB improved, afebrile, weaned off O2 now  - WBCs down to 5.9  - Covid-19 negative; continue special precautions for now; re-test COVID-19 swab in am  - hold off further steroids or Remdesivir for now  - PT/OT    Hyponatremia  - on admission Na 124- likely multifactorial- related to PTA chlorthalidone, prerenal azotemia  - has h/p hyponatremia  - TSH 2.12, urine osm 462, urine Na 7  - received some iv fluids since admission; PTA diuretic held  - serial Na 123--122  - Nephology consult appreciated  - a bolus NS 500cc ordered  - currently on fluid restriction 1500cc/day  - repeat BMP in am     Atrial fibrillation, new onset  New onset A. fib.  Rate controlled and in fact in 50s-60s.  Denies palpitation, syncope.  Chads 2 vascular score of minimum 5 (age-2, HTN-1, DM-1, CAD-1) pending 2D echo.  Annual risk of  CVA 7.2%.   - TSH OK  - Echo pending  - PTA Atenolol stopped and started on Metoprolol 25 mg po BID; HR was low in am in mid 40's; decrease Metoprolol to 12.5 mg po BID with holding parameters  - echo pending  - Tele- Afib with HR in 50's  - started on Apixaban  - pharm liaison consult for med coverage   - follow up with Cardiology as outpatient    Hyperlipidemia, Hypertension   [PTA on Atenolol 50 mg po daily, Avapro 300 mg po daily, chlorthalidone 50 mg po daily and Lovastatin 40 mg po daily]  - PTA Atenolol switched to Metoprolol, continue PTA Avapro, PTA diuretic held because of hyponatremia  - hydralazine iv prn   - resumed PTA statin  - monitor BP    Anemia, acute on chronic  His hemoglobin was 12.4 last year.  Presents with hemoglobin of 10.6--9.8 this am;  Denies hematochezia or melena.  - He will be on anticoagulation for A. Fib.  - iron studies- iron 17, , TY 7  - venofer iv daily ordered by Nephrology  - Screening colonoscopy/EGD if found to be iron deficiency given that he will be on anticoagulation now.     TESS on CKD stage 2   Noted his creatinine is 1.1-1.2 earlier this year.  He presents with a creatinine of 1.4.  Suspect hypertensive/diabetic nephropathy.  He is on ARB.  Unfortunately has gotten contrast for CT PE study in ER.  - started on mild iv hydration  - PTA diuretic held  - cr 1.22--1.33  - avoid nephrotoxic drugs  - BMP in am  - Monitor for fluid overload  - Follow intake and output, daily weight.     Mild transaminitis  No abdominal pain.  Denies heavy alcohol use.  Could be due to underlying viral infection.  - Monitor (cephalosporin can worsen it)  - If abdominal pain or worsened LFTs, will check hepatitis virus panel and also right upper quadrant ultrasound.     BPH  - Continue PTA Flomax, monitor for urinary retention.     DM type 2  [PTA on Glipizide 10 mg qam and 6 mg qpm, Metformin 1000 mg po BID and Lantus 20 units at bedtime]  - HbA1c 6.6  - resumed PTA Glipizide; PTA  "Metformin held on admission   - also resumed PTA Lantus at a lower dose 10 units at bedtime; --236; increase lantus to 15 units at bedtime; he received iv steroids yesterday which may contribute to elevated BSs  - Moderate insulin resistance sliding scale.  - diabetic diet     ANGELITA on CPAP  Morbid obesity   - Resume CPAP per home setting  -Weight management as outpatient per PCP      Diet:  Moderate carbohydrate  DVT Prophylaxis: Apixaban  Renee Catheter: not present  Code Status:  Full code, discussed with patient  Rule Out COVID-19 Handoff: first COVID-19 test negative, continue special precautions for now, repeat COVID-19 in am          Disposition Plan   Expected discharge: 1-2 days, recommended to prior living arrangement once with home care? clinically improved, Na improved.  Entered: Aaliyah Pfeiffer MD 11/12/2020, 4:17 PM       The patient's care was discussed with the Bedside Nurse and Patient.    Aaliyah Pfeiffer MD  Hospitalist Service  [facility]  Contact information available via [facility] Paging/Directory    ______________________________________________________________________    Interval History   When I entered the room- the patient was frustrated about the iv pump beeping continuously; he said that he feels better, SOB improved; denies chest pain. No N/V, no abd pain; initially he said that he wanted to leave because \"nobody responded to the call button\" but I explained to him that it's better to stay overnight, to continue with ABX and monitor Na level.    Data reviewed today: I reviewed all medications, new labs and imaging results over the last 24 hours. I personally reviewed the chest CT image(s) showing as above..    Physical Exam   Vital Signs: Temp: 97.6  F (36.4  C) Temp src: Oral BP: (!) 165/66 Pulse: 75   Resp: (!) 106 SpO2: 94 % O2 Device: None (Room air) Oxygen Delivery: 2 LPM  Weight: 281 lbs 12.8 oz  Constitutional:             awake, alert, " NAD.   HENT:                         Normocephalic, atraumatic, PERRL  Neck:                           Normal range of motion. Neck supple.                                       No tracheal deviation present. No thyromegaly present.   Cardiovascular:           irregularly irregular, mild bradycardia, no murmurs, no rubs  Pulmonary/Chest:       bilateral air entry, no wheezing, no rales, no crackles.   Abdominal:                  Soft, obese, nonT, BS present. There is no rebound and no guarding.   Musculoskeletal:         No joint deformities:.   Lymphadenopathy:     No cervical adenopathy.   Neurological:               Alert and oriented to person, place, and time.   Skin:                            Skin is warm and dry. No rash noted. No pallor.   Extremities:                Trace-1+ pretibial edema bilaterally.    Data   Recent Labs   Lab 11/12/20  0710 11/12/20  0201 11/11/20  1526   WBC 5.9  --  11.4*   HGB 9.8*  --  10.6*   MCV 85  --  85     --  270   * 123* 124*   POTASSIUM 3.8 3.6 3.7   CHLORIDE 89* 90* 90*   CO2 27 25 24   BUN 31* 29 29   CR 1.33* 1.22 1.44*   ANIONGAP 6 8 10   MARISEL 8.8 8.5 8.8   * 285* 177*   ALBUMIN  --   --  3.4   PROTTOTAL  --   --  7.5   BILITOTAL  --   --  1.8*   ALKPHOS  --   --  146   ALT  --   --  104*   AST  --   --  51*   TROPI  --   --  <0.015     No results found for this or any previous visit (from the past 24 hour(s)).  Medications     - MEDICATION INSTRUCTIONS -         albuterol  2 puff Inhalation 4x Daily     apixaban ANTICOAGULANT  5 mg Oral BID     atorvastatin  10 mg Oral QPM     azithromycin  250 mg Oral Daily     cefTRIAXone  1 g Intravenous Q24H     glipiZIDE  10 mg Oral QAM AC     glipiZIDE  5 mg Oral QPM     insulin aspart  1-7 Units Subcutaneous TID AC     insulin aspart  1-5 Units Subcutaneous At Bedtime     insulin glargine  15 Units Subcutaneous At Bedtime     irbesartan  300 mg Oral Daily     iron sucrose (VENOFER) intermittent infusion   200 mg Intravenous Daily     magnesium sulfate  1 g Intravenous Q6H     metoprolol tartrate  12.5 mg Oral BID     potassium chloride  20 mEq Oral BID     sodium chloride (PF)  3 mL Intracatheter Q8H     tamsulosin  0.8 mg Oral At Bedtime

## 2020-11-12 NOTE — PROGRESS NOTES
Pt currently does not wear home, years ago trial. Stated he did not tolerated. Declines hospital cpap. Ariadne Martinez, RT

## 2020-11-12 NOTE — H&P
[facility]  History and Physical - Hospitalist Service       Date of Admission:  11/11/2020    Assessment & Plan   Dallin Mckeon is a 75 year old male with medical history significant for DM 2, HTN, HL, ANGELITA, BPH, CKD stage II, morbid obesity was sent to the ER from urgent care for evaluation of above symptoms.  Patient was febrile and hypoxic and is being admitted on 11/11/2020 for possible COVID-19/pneumonia.      Bilateral pneumonia suspect, high suspicion for COVID-19  Patient with dry cough, myalgia, fatigue, dyspnea and hypoxia and fever of 101.8.  CT chest shows bilateral groundglass opacity.  Highly concerning for COVID-19.  His procalcitonin is also mildly elevated at 0.72.  Mild leukocytosis -WBC 11.4.  Inflammatory markers including CRP as well as LFTs elevated. Lactic acid borderline 2.1.  -Pending result of COVID-19, will treat him with IV Rocephin and azithromycin, received first dose in the ER.  Even if the test is negative, given high suspicion, will continue on isolation and retest in 48-72 hours.  -Supplemental O2  -Incentive spirometry  -Received a dose of dexamethasone in ER, should have resolved by tomorrow and will decide whether to continue.  If test is positive, given hypoxia, will start on remdesivir as well. And if positive, will get daily labs as per protocol.  -He will be on long-term anticoagulation given A. fib RVR see below.  - borderline elevated LA, suspect due to hypoxia. IVF. Recheck in am.    Hyponatremia  Serum sodium 124.  Suspect related to underlying pulmonary process/chlorthalidone.  -Gentle IV hydration and follow    Atrial fibrillation, new onset  Hyperlipidemia, Hypertension   New onset A. fib.  Rate controlled and in fact in 50s-60s.  Denies palpitation, syncope.  Chads 2 vascular score of minimum 5 (age-2, HTN-1, DM-1, CAD-1) pending 2D echo.  Annual risk of CVA 7.2%.  I have started him on apixaban.  He will need cardiology follow-up shortly  after discharge.    -I will change his PTA atenolol to metoprolol p.o., as needed IV metoprolol as well available if HR> 120/10.  -Monitor on telemetry  -Check TSH  -2D echo  -Hold PTA chlorthalidone (hyponatremia), continue irbesartan.  -proBNP is elevated at 11,000.  Received IV fluid in ER, gentle IV hydration overnight (wrosened Cr/received contrast in ER), monitor for worsening dyspnea/fluid overload.  Daily intake and output and weight.  - resume PTA statin    Anemia, acute on chronic  His hemoglobin was 12.4 last year.  Presents with hemoglobin of 10.6.  Denies hematochezia or melena.  -He will be on anticoagulation for A. Fib.  -Check iron studies  -Screening colonoscopy/EGD if found to be iron deficiency given that he will be on anticoagulation now.    CKD stage 2   Noted his creatinine is 1.1-1.2 earlier this year.  He presents with a creatinine of 1.4.  Suspect hypertensive/diabetic nephropathy.  He is on ARB.  Unfortunately has gotten contrast for CT PE study in ER.  -He does have elevated proBNP but given his fever, decreased p.o. intake, I will continue him on gentle IV hydration.  -Monitor for fluid overload  -Follow intake and output, daily weight.  -Follow BMP.    Mild transaminitis  No abdominal pain.  Denies heavy alcohol use.  Could be due to underlying viral infection.  -Monitor (cephalosporin can worsen it)  -If abdominal pain or worsened LFTs, will check hepatitis virus panel and also right upper quadrant ultrasound.    BPH  -Continue PTA Flomax, monitor for urinary retention.    Type 2 diabetes  -Resume PTA glipizide  -Resume his PTA Lantus at decreased dose, given fever, decreased appetite.  Follow-up blood sugar and increased to PTA dosing if blood sugar runs high.  Patient has received steroid in ER and most likely will be continued if COVID-19 positive.  Anticipate blood sugar will run high given his steroid use.  -Moderate insulin resistance sliding scale.  -Check HbA1c    ANGELITA on  "CPAP  Morbid obesity   -Resume CPAP per home setting  -Weight management as outpatient per PCP      Diet:  Moderate carbohydrate  DVT Prophylaxis: Apixaban  Renee Catheter: not present  Code Status:  Full code, discussed with patient  Rule Out COVID-19 Handoff:  Dallin is NOT A LOW SUSPICION PUI (needs further investigation).    Follow these instructions:    If COVID test is positive -> continue isolation precautions    If 1st COVID test is negative -> continue isolation precautions    -  Order a repeat COVID test to be done 72 hours after the 1st test  -  Place \"PUI Isolation\" nurse communication order  -  Consider ID consult    If 2nd COVID test performed after 72 hours is negative -> consider discontinuing COVID-specific isolation precautions if clinical course atypical for COVID and/or an alternative diagnosis emerges       Disposition Plan   Expected discharge: 2 - 3 days, recommended to TBD once Fever trends down, hypoxia improves.  Entered: Neena Marti MD 11/11/2020, 6:52 PM     The patient's care was discussed with the Patient.    Neena Marti MD  [facility]  Contact information available via [facility] Paging/Directory      ______________________________________________________________________    Chief Complaint   Fever, cough, shortness of breath    History is obtained from the patient over the phone, chart review, discussion with ER physician    History of Present Illness   Dallin Mckeon is a 75 year old male with medical history significant for DM 2, HTN, HL, ANGELITA, BPH, CKD stage II, morbid obesity was sent to the ER from urgent care for evaluation of above symptoms.    Mr. Mckeon is a very pleasant male, who reports 2-3 days of dry cough, fatigue, shortness of breath and feeling feverish.  Because his dyspnea was getting worse, his wife took him to the urgent care for evaluation.  Patient was febrile with a temperature of 101.8 during the clinic.  He was also noted to be on " a fever which was new.  He was then sent to ER for further evaluation.    Patient denies loss of smell or taste.  No nausea vomiting or diarrhea.  Has chronic low back pain.  Denies being dizzy.  No palpitation or chest pain.  Denies urinary symptoms.    In ER, patient was evaluated by Dr. Tse.  Patient was hypertensive.  Patient initially had SPO2 91-92% on room air but was noted that it dropped to 87-88 with good waveform.  Was restarted on supplemental O2.  Significant labs included sodium 124, creatinine 1.44,  AST 51  lactic acid 2.1 proBNP 11,084 procalcitonin 0.72 troponin less than 0.015, WBC 11.4, hemoglobin 10.6.  CT chest showed Moderate to severe interstitial and groundglass infiltrates.  IV Rocephin and azithromycin was given.  IV normal saline 500 cc bolus was given.  UA with reflex was ordered.  Dexamethasone 8 mg IV, glipizide 10 mg his bedtime dose and irbesartan 300 mg once was ordered.  COVID-19 nasal swab was obtained and an admission was requested for further management given symptoms and signs highly concerning for COVID-19.    Review of Systems    The 10 point Review of Systems is negative other than noted in the HPI or here.      Past Medical History    I have reviewed this patient's medical history and updated it with pertinent information if needed.   CKD stage 2   begning prostatic hyperplasia  Type 2 diabetes  Morbid obesity   Hyperlipidemia   Hypertension   ANGELITA on CPAP    Past Surgical History   I have reviewed this patient's surgical history and updated it with pertinent information if needed.  - Reviewed    Social History   I have reviewed this patient's social history and updated it with pertinent information if needed.  Social History     Tobacco Use     Smoking status: Not on file   Substance Use Topics     Alcohol use: Not on file     Drug use: Not on file       Family History     Was reviewed with patient, not pertinent to his presentation.    Prior to Admission  Medications   Prior to Admission Medications   Prescriptions Last Dose Informant Patient Reported? Taking?   atenolol (TENORMIN) 50 MG tablet 11/10/2020 at Unknown time Self Yes Yes   Sig: Take 50 mg by mouth daily   chlorthalidone (HYGROTON) 50 MG tablet 11/10/2020 at Unknown time Self Yes Yes   Sig: Take 50 mg by mouth daily   glipiZIDE (GLUCOTROL) 10 MG tablet 11/10/2020 at Unknown time Self Yes Yes   Sig: Take 10 mg by mouth daily before breakfast   glipiZIDE (GLUCOTROL) 5 MG tablet 11/10/2020 at Unknown time Self Yes Yes   Sig: Take 5 mg by mouth every evening   insulin glargine (LANTUS VIAL) 100 UNIT/ML vial 11/10/2020 at Unknown time Self Yes Yes   Sig: Inject 20 Units Subcutaneous At Bedtime   irbesartan (AVAPRO) 300 MG tablet 11/10/2020 at Unknown time Self Yes Yes   Sig: Take 300 mg by mouth daily   lovastatin (MEVACOR) 40 MG tablet 11/10/2020 at Unknown time Self Yes Yes   Sig: Take 40 mg by mouth At Bedtime   metFORMIN (GLUCOPHAGE) 1000 MG tablet 11/10/2020 at Unknown time Self Yes Yes   Sig: Take 1,000 mg by mouth 2 times daily (with meals)   tamsulosin (FLOMAX) 0.4 MG capsule 11/10/2020 at Unknown time Self Yes Yes   Sig: Take 0.8 mg by mouth At Bedtime      Facility-Administered Medications: None     Allergies   Allergies   Allergen Reactions     Lisinopril Anaphylaxis and Cough     Simvastatin Anaphylaxis and Difficulty breathing       Physical Exam   Vital Signs: Temp: 98.8  F (37.1  C) Temp src: Oral BP: (!) 170/67 Pulse: 53   Resp: 24 SpO2: 97 % O2 Device: Nasal cannula Oxygen Delivery: 2 LPM  Weight: 260 lbs 0 oz    Per ED physician's findings  (I did not directly examine the patient)  Physical Exam  Nursing note and vitals reviewed.  Constitutional:             Labored breathing, high BMI.   HENT:   Head:                           Atraumatic.   Mouth/Throat:              Oropharynx is clear and moist. No oropharyngeal exudate.   Eyes:                           Pupils are equal, round, and  reactive to light.   Neck:                           Normal range of motion. Neck supple.                                       No tracheal deviation present. No thyromegaly present.   Cardiovascular:           Regular rate irregular irregular rhythm. no murmur   Pulmonary/Chest:       Breath sounds are clear and equal without wheezes. Bibasilar crackles.   Abdominal:                  Soft. Bowel sounds are normal. Exhibits no distension and                                       no mass. There is no tenderness.                                       There is no rebound and no guarding.   Musculoskeletal:         Exhibits no edema.   Lymphadenopathy:     No cervical adenopathy.   Neurological:               Alert and oriented to person, place, and time.   Skin:                            Skin is warm and dry. No rash noted. No pallor. Trace pretibial edema bilaterally.    Data   Data reviewed today: I reviewed all medications, new labs and imaging results over the last 24 hours. I personally reviewed the EKG tracing showing Atrial fibrillation, rate 70.  CT chest, pulmonary angiogram result was reviewed.  Negative for PE but has bilateral opacities.    Recent Labs   Lab 11/11/20  1526   WBC 11.4*   HGB 10.6*   MCV 85      *   POTASSIUM 3.7   CHLORIDE 90*   CO2 24   BUN 29   CR 1.44*   ANIONGAP 10   MARISEL 8.8   *   ALBUMIN 3.4   PROTTOTAL 7.5   BILITOTAL 1.8*   ALKPHOS 146   *   AST 51*   TROPI <0.015     Recent Results (from the past 24 hour(s))   CT Chest Pulmonary Embolism w Contrast    Narrative    CT CHEST PULMONARY EMBOLISM WITH CONTRAST 11/11/2020 5:34 PM    CLINICAL HISTORY: Check for pulmonary embolism or pneumonia, SOB,  fever, elevated D-dimer.    TECHNIQUE: CT angiogram chest during arterial phase injection IV  contrast. 2D and 3D MIP reconstructions were performed by the CT  technologist. Dose reduction techniques were used.     CONTRAST: 80 mL [id number]    COMPARISON:  None.    FINDINGS:  ANGIOGRAM CHEST: Pulmonary arteries are normal caliber and negative  for pulmonary emboli. Thoracic aorta is negative for dissection. No CT  evidence of right heart strain.    LUNGS AND PLEURA: Trace pleural fluid bilaterally. Interstitial and  groundglass infiltrates likely present, somewhat limited detail due to  motion artifact.    MEDIASTINUM/AXILLAE: A few mildly prominent lymph nodes are noted,  likely reactive in this setting.    UPPER ABDOMEN: No acute findings in the visualized upper abdomen.    MUSCULOSKELETAL: No suspicious bony lesions.      Impression    IMPRESSION:  1.  No pulmonary embolism demonstrated.  2.  Moderate to severe interstitial and groundglass infiltrates which  are nonspecific.  3.  Trace pleural fluid bilaterally.

## 2020-11-12 NOTE — PROVIDER NOTIFICATION
Brief update:    Patient regarding increased hypoxia/oxygen needs    Here with hyponatremia.  Earlier on shift, IV fluids were reduced from 100 mL/h to 50 mL/h.  Received bolus of IV fluids in the emergency department as well.  High suspicion COVID-19.    Stop fluids, recheck BMP  May need to consider diuresis    Bam Andrews MD  11:34 PM

## 2020-11-12 NOTE — PROGRESS NOTES
11/12/20 1413   Quick Adds   Type of Visit Initial Occupational Therapy Evaluation   Living Environment   People in home spouse   Current Living Arrangements apartment   Living Environment Comments lives in a one level home. 1 step to enter in the back. tub shower   Self-Care   Usual Activity Tolerance moderate   Current Activity Tolerance fair   Regular Exercise No   Equipment Currently Used at Home shower chair;cane, straight;walker, standard   Activity/Exercise/Self-Care Comment I with bathing and dressing. she cooks, he vacuums, snow blows some.    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  He was admitted on 11/11 for rule out COVID-19.  He complained of cough, fatigue, myalgias and fevers and a CT scan showed possible bilateral ground-glass infiltrates.  He was treated with antibiotics and Decadron.  A CT scan of the chest was done, as noted above, with IV contrast.  He had new onset of AFib.   Performance Patterns (Routines, Roles, Habits) pt is normally I with all ADL's, including snow blowing, driving, shopping and managing meds   Existing Precautions/Restrictions fall;oxygen therapy device and L/min   General Observations and Info pt sitting up in chair, agreeable to OT eval   Cognitive Status Examination   Orientation Status orientation to person, place and time   Affect/Mental Status (Cognitive) WNL   Follows Commands WNL   Cognitive Status Comments does get off subject and need redirection at times. will continue to monitor   Visual Perception   Impact of Vision Impairment on Function (Vision) states he had cateract surgery recently and has 20/20 vision   Sensory   Sensory Quick Adds No deficits were  identified   Pain Assessment   Patient Currently in Pain No   Integumentary/Edema   Integumentary/Edema no deficits were identifed   Range of Motion Comprehensive   General Range of Motion bilateral upper extremity ROM WNL   Strength Comprehensive (MMT)   Comment, General Manual Muscle Testing (MMT) Assessment overall B UE's 4/5   Muscle Tone Assessment   Muscle Tone Quick Adds No deficits were identified   Coordination   Upper Extremity Coordination No deficits were identified   Bed Mobility   Comment (Bed Mobility) did not observe, pt up in recliner at arrival and finished in recliner at end of session   Transfers   Transfer Comments SBA with sit to stand and ambulation in room   Activities of Daily Living   BADL Assessment/Intervention lower body dressing   Lower Body Dressing Assessment/Training   [place] Level (Lower Body Dressing) supervision   Instrumental Activities of Daily Living (IADL)   Previous Responsibilities housekeeping;shopping;medication management;yardwork;driving   IADL Comments wife and him do shopping, housekeeping and finances together.   Clinical Impression   Criteria for Skilled Therapeutic Interventions Met (OT) yes   OT Diagnosis decreased activity tolerance   OT Problem List-Impairments impacting ADL problems related to;activity tolerance impaired   ADL comments/analysis pt demo's decreased BR transfers, activity tolerance and fatigue with acitivites   Assessment of Occupational Performance 1-3 Performance Deficits   Identified Performance Deficits decreased dressing, home mgmt   Planned Therapy Interventions (OT) ADL retraining;transfer training;progressive activity/exercise;home program guidelines   Clinical Decision Making Complexity (OT) low complexity   Therapy Frequency (OT) Daily   Predicted Duration of Therapy 3 days   Risks and Benefits of Treatment have been explained. Yes   Patient, Family & other staff in agreement with plan of care Yes   OT Discharge Planning    OT  "Discharge Recommendation (DC Rec) home with home care occupational therapy   OT Rationale for DC Rec pt is below baseline, he demo's decreased activity tolerance which affects safety and I with ADL\"s and IADL's. pt would benefit from continued daily therapy while in the hospital and anticipate home therapy will be beneficial at discharge as leaving the home requires signficantly taxing effort.    Total Evaluation Time (Minutes)   Total Evaluation Time (Minutes) 10     "

## 2020-11-12 NOTE — PLAN OF CARE
PT: Orders received, chart reviewed, attempted session, however, per RN pt just back to bed, upset, wanting to rest, will hold PT at this time.

## 2020-11-12 NOTE — PLAN OF CARE
Pt is up with 1 to the bathroom, complains of neck and shoulder pain, sleeping in a recliner, LS diminished in the bases, Pt is DUMAS and is on 3 LPM, Tele A-fib SVR, alert and oriented, BP elevated upon arrival to unit, scheduled meds and prn hydralazine given with good outcome.

## 2020-11-12 NOTE — PLAN OF CARE
Pt a/o, VSS, sats mid to upper 90's with 2L o2 via nasal cannula. PRN Tylenol given for c/o neck and shoulder pain. Lung sounds diminished in bilateral bases, very DUMAS. BLE linette with +1-+2 edema. Covid pending.

## 2020-11-13 LAB
ALBUMIN SERPL-MCNC: 3.1 G/DL (ref 3.4–5)
ALP SERPL-CCNC: 124 U/L (ref 40–150)
ALT SERPL W P-5'-P-CCNC: 105 U/L (ref 0–70)
ANION GAP SERPL CALCULATED.3IONS-SCNC: 11 MMOL/L (ref 3–14)
AST SERPL W P-5'-P-CCNC: 67 U/L (ref 0–45)
BILIRUB DIRECT SERPL-MCNC: 0.3 MG/DL (ref 0–0.2)
BILIRUB SERPL-MCNC: 0.6 MG/DL (ref 0.2–1.3)
BUN SERPL-MCNC: 36 MG/DL (ref 7–30)
CALCIUM SERPL-MCNC: 9 MG/DL (ref 8.5–10.1)
CHLORIDE SERPL-SCNC: 90 MMOL/L (ref 94–109)
CO2 SERPL-SCNC: 23 MMOL/L (ref 20–32)
CREAT SERPL-MCNC: 1.38 MG/DL (ref 0.66–1.25)
FOLATE SERPL-MCNC: 6.2 NG/ML
GFR SERPL CREATININE-BSD FRML MDRD: 49 ML/MIN/{1.73_M2}
GLUCOSE BLDC GLUCOMTR-MCNC: 104 MG/DL (ref 70–99)
GLUCOSE BLDC GLUCOMTR-MCNC: 169 MG/DL (ref 70–99)
GLUCOSE BLDC GLUCOMTR-MCNC: 231 MG/DL (ref 70–99)
GLUCOSE BLDC GLUCOMTR-MCNC: 71 MG/DL (ref 70–99)
GLUCOSE SERPL-MCNC: 65 MG/DL (ref 70–99)
LABORATORY COMMENT REPORT: NORMAL
MAGNESIUM SERPL-MCNC: 2.8 MG/DL (ref 1.6–2.3)
OSMOLALITY SERPL: 259 MMOL/KG (ref 280–301)
PHOSPHATE SERPL-MCNC: 2.5 MG/DL (ref 2.5–4.5)
POTASSIUM SERPL-SCNC: 3.7 MMOL/L (ref 3.4–5.3)
PROT SERPL-MCNC: 7.1 G/DL (ref 6.8–8.8)
SARS-COV-2 RNA SPEC QL NAA+PROBE: NEGATIVE
SARS-COV-2 RNA SPEC QL NAA+PROBE: NORMAL
SODIUM SERPL-SCNC: 124 MMOL/L (ref 133–144)
SPECIMEN SOURCE: NORMAL
SPECIMEN SOURCE: NORMAL
VIT B12 SERPL-MCNC: 176 PG/ML (ref 193–986)

## 2020-11-13 PROCEDURE — 36415 COLL VENOUS BLD VENIPUNCTURE: CPT | Performed by: INTERNAL MEDICINE

## 2020-11-13 PROCEDURE — 250N000013 HC RX MED GY IP 250 OP 250 PS 637: Performed by: HOSPITALIST

## 2020-11-13 PROCEDURE — 84100 ASSAY OF PHOSPHORUS: CPT | Performed by: INTERNAL MEDICINE

## 2020-11-13 PROCEDURE — 999N001017 HC STATISTIC GLUCOSE BY METER IP

## 2020-11-13 PROCEDURE — 250N000011 HC RX IP 250 OP 636: Performed by: HOSPITALIST

## 2020-11-13 PROCEDURE — 250N000013 HC RX MED GY IP 250 OP 250 PS 637: Performed by: INTERNAL MEDICINE

## 2020-11-13 PROCEDURE — 80076 HEPATIC FUNCTION PANEL: CPT | Performed by: INTERNAL MEDICINE

## 2020-11-13 PROCEDURE — 99232 SBSQ HOSP IP/OBS MODERATE 35: CPT | Performed by: INTERNAL MEDICINE

## 2020-11-13 PROCEDURE — 250N000011 HC RX IP 250 OP 636: Performed by: INTERNAL MEDICINE

## 2020-11-13 PROCEDURE — 82746 ASSAY OF FOLIC ACID SERUM: CPT | Performed by: INTERNAL MEDICINE

## 2020-11-13 PROCEDURE — 210N000002 HC R&B HEART CARE

## 2020-11-13 PROCEDURE — 83930 ASSAY OF BLOOD OSMOLALITY: CPT | Performed by: INTERNAL MEDICINE

## 2020-11-13 PROCEDURE — 258N000003 HC RX IP 258 OP 636: Performed by: INTERNAL MEDICINE

## 2020-11-13 PROCEDURE — 80053 COMPREHEN METABOLIC PANEL: CPT | Performed by: INTERNAL MEDICINE

## 2020-11-13 PROCEDURE — 250N000012 HC RX MED GY IP 250 OP 636 PS 637: Performed by: INTERNAL MEDICINE

## 2020-11-13 PROCEDURE — 83735 ASSAY OF MAGNESIUM: CPT | Performed by: INTERNAL MEDICINE

## 2020-11-13 PROCEDURE — 82607 VITAMIN B-12: CPT | Performed by: INTERNAL MEDICINE

## 2020-11-13 PROCEDURE — U0003 INFECTIOUS AGENT DETECTION BY NUCLEIC ACID (DNA OR RNA); SEVERE ACUTE RESPIRATORY SYNDROME CORONAVIRUS 2 (SARS-COV-2) (CORONAVIRUS DISEASE [COVID-19]), AMPLIFIED PROBE TECHNIQUE, MAKING USE OF HIGH THROUGHPUT TECHNOLOGIES AS DESCRIBED BY CMS-2020-01-R: HCPCS | Performed by: INTERNAL MEDICINE

## 2020-11-13 PROCEDURE — 82248 BILIRUBIN DIRECT: CPT | Performed by: INTERNAL MEDICINE

## 2020-11-13 RX ORDER — CYANOCOBALAMIN 1000 UG/ML
1000 INJECTION, SOLUTION INTRAMUSCULAR; SUBCUTANEOUS DAILY
Status: DISCONTINUED | OUTPATIENT
Start: 2020-11-13 | End: 2020-11-15 | Stop reason: HOSPADM

## 2020-11-13 RX ORDER — AMLODIPINE BESYLATE 5 MG/1
5 TABLET ORAL DAILY
Status: DISCONTINUED | OUTPATIENT
Start: 2020-11-13 | End: 2020-11-14

## 2020-11-13 RX ADMIN — ALBUTEROL SULFATE 2 PUFF: 90 AEROSOL, METERED RESPIRATORY (INHALATION) at 09:42

## 2020-11-13 RX ADMIN — AZITHROMYCIN MONOHYDRATE 250 MG: 250 TABLET ORAL at 09:40

## 2020-11-13 RX ADMIN — GLIPIZIDE 10 MG: 10 TABLET ORAL at 09:40

## 2020-11-13 RX ADMIN — CYANOCOBALAMIN 1000 MCG: 1000 INJECTION, SOLUTION INTRAMUSCULAR at 17:05

## 2020-11-13 RX ADMIN — AMLODIPINE BESYLATE 5 MG: 5 TABLET ORAL at 21:38

## 2020-11-13 RX ADMIN — TAMSULOSIN HYDROCHLORIDE 0.8 MG: 0.4 CAPSULE ORAL at 21:39

## 2020-11-13 RX ADMIN — Medication 1 TABLET: at 17:05

## 2020-11-13 RX ADMIN — POTASSIUM CHLORIDE 20 MEQ: 1.5 POWDER, FOR SOLUTION ORAL at 21:38

## 2020-11-13 RX ADMIN — INSULIN ASPART 1 UNITS: 100 INJECTION, SOLUTION INTRAVENOUS; SUBCUTANEOUS at 18:28

## 2020-11-13 RX ADMIN — POTASSIUM CHLORIDE 20 MEQ: 1.5 POWDER, FOR SOLUTION ORAL at 09:00

## 2020-11-13 RX ADMIN — CEFTRIAXONE SODIUM 1 G: 1 INJECTION, POWDER, FOR SOLUTION INTRAMUSCULAR; INTRAVENOUS at 21:43

## 2020-11-13 RX ADMIN — APIXABAN 5 MG: 5 TABLET, FILM COATED ORAL at 21:39

## 2020-11-13 RX ADMIN — ATORVASTATIN CALCIUM 10 MG: 10 TABLET, FILM COATED ORAL at 21:38

## 2020-11-13 RX ADMIN — ALBUTEROL SULFATE 2 PUFF: 90 AEROSOL, METERED RESPIRATORY (INHALATION) at 17:05

## 2020-11-13 RX ADMIN — APIXABAN 5 MG: 5 TABLET, FILM COATED ORAL at 09:40

## 2020-11-13 RX ADMIN — IRBESARTAN 300 MG: 150 TABLET ORAL at 09:40

## 2020-11-13 RX ADMIN — ALBUTEROL SULFATE 2 PUFF: 90 AEROSOL, METERED RESPIRATORY (INHALATION) at 21:39

## 2020-11-13 RX ADMIN — ALBUTEROL SULFATE 2 PUFF: 90 AEROSOL, METERED RESPIRATORY (INHALATION) at 14:41

## 2020-11-13 RX ADMIN — INSULIN GLARGINE 12 UNITS: 100 INJECTION, SOLUTION SUBCUTANEOUS at 21:40

## 2020-11-13 RX ADMIN — SODIUM CHLORIDE 500 ML: 9 INJECTION, SOLUTION INTRAVENOUS at 14:43

## 2020-11-13 RX ADMIN — IRON SUCROSE 200 MG: 20 INJECTION, SOLUTION INTRAVENOUS at 09:41

## 2020-11-13 ASSESSMENT — ACTIVITIES OF DAILY LIVING (ADL)
ADLS_ACUITY_SCORE: 17
ADLS_ACUITY_SCORE: 16
ADLS_ACUITY_SCORE: 17
ADLS_ACUITY_SCORE: 15
ADLS_ACUITY_SCORE: 15
ADLS_ACUITY_SCORE: 16

## 2020-11-13 ASSESSMENT — MIFFLIN-ST. JEOR: SCORE: 2040.34

## 2020-11-13 NOTE — PROGRESS NOTES
[facility]    Medicine Progress Note - Hospitalist Service       Date of Admission:  11/11/2020  Assessment & Plan       Dallin Mckeon is a 75 year old male with medical history significant for DM 2, HTN, HL, ANGELITA, BPH, CKD stage II, morbid obesity was sent to the ER from urgent care for evaluation of above symptoms.  Patient was febrile and hypoxic and is being admitted on 11/11/2020 for possible COVID-19/pneumonia.     Bilateral pneumonia  R/O COVID-19 PNA  Hypoxic respiratory failure  Patient with dry cough, myalgia, fatigue, dyspnea and hypoxia and fever of 101.8.  CT chest shows bilateral groundglass opacity.  Highly concerning for COVID-19.  His procalcitonin is also mildly elevated at 0.72.  Mild leukocytosis -WBC 11.4.  Inflammatory markers including CRP as well as LFTs elevated. Lactic acid borderline 2.1.  - started on ceftriaxone/Zithromax on admission; also received Decadron on admission given concern for COVID-19 infection  - proBNP elevated on admission 25918 but he does not look overt fluid overload;  - COVID- 19 negativeX2; discontinue special precautions   - Echo pending  - supplemental O2, IS, tylenol prn  - doing better today, SOB improved, afebrile, weaned off O2 now  - WBCs down to 5.9  - continue Ceftriaxone/Zithromax  - PT/OT- rec discharge home with home OT but apparently he feels he does not need HHC at discharge, as per CC note    Hyponatremia  - on admission Na 124- likely multifactorial- related to PTA chlorthalidone, prerenal azotemia  - has h/o hyponatremia  - TSH 2.12, urine osm 462, urine Na 7  - received some iv fluids since admission; PTA diuretic held  - serial Na 123--122  - Nephology consult appreciated  - a bolus NS 500cc ordered on 11/12  - currently on fluid restriction 1500cc/day  - Na 124 today  - repeat BMP in am     Atrial fibrillation, new onset  New onset A. fib.  Rate controlled and in fact in 50s-60s.  Denies palpitation, syncope.  Chads 2  vascular score of minimum 5 (age-2, HTN-1, DM-1, CAD-1) pending 2D echo.  Annual risk of CVA 7.2%.   - TSH OK  - Echo pending  - PTA Atenolol stopped and started on Metoprolol 25 mg po BID; HR was low in am in mid 40's; decrease Metoprolol to 12.5 mg po BID with holding parameters; Metoprolol still not given because HR<60; will stop BB now  - Tele- Afib with HR in 50's  - started on Apixaban  - pharm liaison consult for med coverage   - follow up with Cardiology as outpatient    Hyperlipidemia, Hypertension   [PTA on Atenolol 50 mg po daily, Avapro 300 mg po daily, chlorthalidone 50 mg po daily and Lovastatin 40 mg po daily]  - PTA Atenolol switched to Metoprolol, continue PTA Avapro, PTA diuretic held because of hyponatremia  - BP elevated, Metoprolol not given because of slow HR; will discontinue Metoprolol and start Norvasc 5 mg po daily; continue Avapro 300 mg po daily  - hydralazine iv prn   - resumed PTA statin  - monitor BP, adjust meds as needed    Anemia, acute on chronic  His hemoglobin was 12.4 last year.  Presents with hemoglobin of 10.6--9.8 this am;  Denies hematochezia or melena.  - He will be on anticoagulation for A. Fib.  - iron studies- iron 17, , TY 7  - venofer iv daily ordered by Nephrology  - Screening colonoscopy/EGD if found to be iron deficiency given that he will be on anticoagulation now.    Vitamin B12 deficiency  - B12 level 176  - started on Vit B12 1000mcg im daily for 3 doses     TESS on CKD stage 2   Noted his creatinine is 1.1-1.2 earlier this year.  He presents with a creatinine of 1.4.  Suspect hypertensive/diabetic nephropathy.  He is on ARB.  Unfortunately has gotten contrast for CT PE study in ER.  - started on mild iv hydration  - PTA diuretic held  - cr 1.22--1.33--1.38  - avoid nephrotoxic drugs  - BMP in am  - Monitor for fluid overload  - Follow intake and output, daily weight.     Mild transaminitis  No abdominal pain.  Denies heavy alcohol use.  Could be due to  underlying viral infection.  - Monitor (cephalosporin can worsen it)  - If abdominal pain or worsened LFTs, will check hepatitis virus panel and also right upper quadrant ultrasound.     BPH  - Continue PTA Flomax, monitor for urinary retention.     DM type 2  [PTA on Glipizide 10 mg qam and 6 mg qpm, Metformin 1000 mg po BID and Lantus 20 units at bedtime]  - HbA1c 6.6  - on admission- PTA Glipizide resumed and PTA Metformin held   - also resumed PTA Lantus at a lower dose 10 units at bedtime; --236 yesterday; increased lantus to 15 units at bedtime;   - BSs low today 65--71  - stop Glipizide; decrease Lantus to 12 units qhs  - Moderate insulin resistance sliding scale.  - diabetic diet     ANGELITA on CPAP  Morbid obesity   - Resume CPAP per home setting  -Weight management as outpatient per PCP      Diet:  Moderate carbohydrate  DVT Prophylaxis: Apixaban  Renee Catheter: not present  Code Status:  Full code, discussed with patient  Rule Out COVID-19 Handoff: first COVID-19 test negative, continue special precautions for now, repeat COVID-19 in am          Disposition Plan   Expected discharge: 1-2 days, recommended to prior living arrangement once with home care? Na improved.  Entered: Aaliyah Pfeiffer MD 11/13/2020, 2:59 PM       The patient's care was discussed with the Bedside Nurse, Patient and Nephrology Consultant.    Aaliyah Pfeiffer MD  Hospitalist Service  [facility]  Contact information available via [facility] Paging/Directory    ______________________________________________________________________    Interval History   Feeling better, no SOB at rest, no coughing, no chest pain, no N/V, no abd pain.     Data reviewed today: I reviewed all medications, new labs and imaging results over the last 24 hours. I personally reviewed no images or EKG's today.    Physical Exam   Vital Signs: Temp: 97.8  F (36.6  C) Temp src: Oral BP: (!) 169/82 Pulse: 72   Resp: 18 SpO2: 92 % O2  Device: Nasal cannula Oxygen Delivery: 2 LPM  Weight: 287 lbs 8 oz  Constitutional:             awake, alert, NAD.   HENT:                         Normocephalic, atraumatic, PERRL  Neck:                           Normal range of motion. Neck supple.                                       No tracheal deviation present. No thyromegaly present.   Cardiovascular:           irregularly irregular, mild bradycardia, no murmurs, no rubs  Pulmonary/Chest:       bilateral air entry, no wheezing, no rales, no crackles.   Abdominal:                  Soft, obese, nonT, BS present. There is no rebound and no guarding.   Musculoskeletal:         No joint deformities:.   Lymphadenopathy:     No cervical adenopathy.   Neurological:               Alert and oriented to person, place, and time.   Skin:                            Skin is warm and dry. No rash noted. No pallor.   Extremities:                Trace-1+ pretibial edema bilaterally.    Data   Recent Labs   Lab 11/13/20  0635 11/12/20  0710 11/12/20  0201 11/11/20  1526   WBC  --  5.9  --  11.4*   HGB  --  9.8*  --  10.6*   MCV  --  85  --  85   PLT  --  256  --  270   * 122* 123* 124*   POTASSIUM 3.7 3.8 3.6 3.7   CHLORIDE 90* 89* 90* 90*   CO2 23 27 25 24   BUN 36* 31* 29 29   CR 1.38* 1.33* 1.22 1.44*   ANIONGAP 11 6 8 10   MARISEL 9.0 8.8 8.5 8.8   GLC 65* 216* 285* 177*   ALBUMIN 3.1*  --   --  3.4   PROTTOTAL 7.1  --   --  7.5   BILITOTAL 0.6  --   --  1.8*   ALKPHOS 124  --   --  146   *  --   --  104*   AST 67*  --   --  51*   TROPI  --   --   --  <0.015     No results found for this or any previous visit (from the past 24 hour(s)).  Medications     - MEDICATION INSTRUCTIONS -         sodium chloride 0.9%  500 mL Intravenous Once     albuterol  2 puff Inhalation 4x Daily     apixaban ANTICOAGULANT  5 mg Oral BID     atorvastatin  10 mg Oral QPM     azithromycin  250 mg Oral Daily     cefTRIAXone  1 g Intravenous Q24H     cyanocobalamin  1,000 mcg Intramuscular  Daily     folic acid-vit B6-vit B12  1 tablet Oral Daily     glipiZIDE  10 mg Oral QAM AC     glipiZIDE  5 mg Oral QPM     insulin aspart  1-7 Units Subcutaneous TID AC     insulin aspart  1-5 Units Subcutaneous At Bedtime     insulin glargine  15 Units Subcutaneous At Bedtime     irbesartan  300 mg Oral Daily     iron sucrose (VENOFER) intermittent infusion  200 mg Intravenous Daily     metoprolol tartrate  12.5 mg Oral BID     potassium chloride  20 mEq Oral BID     sodium chloride (PF)  3 mL Intracatheter Q8H     tamsulosin  0.8 mg Oral At Bedtime

## 2020-11-13 NOTE — PROGRESS NOTES
Assessment and Plan:   CKD-3: got IV contrast with CT. Irbesartan, chlorthalidone and metformin stoped.     Na remains low, 124 on adm, now 124. K 3.7. Cr: 1.22 > 1.33 > 1.38.   Replace Mg and K. Got 500 ml NS bolus yesterday.       Interval History:   COVID-19 neg 11/11, pending from 11/13.  On zithromax and rocephin.   DM  HT: now on irbesartan, metoprolol. Relatively bradycardic.   ANGELITA on CPAP  BPH  Obesity     Anemia: Ferr 331, folate borderline at 6.2. B12 low at 176. Will replete. Getting venofer iv too.            Review of Systems:   C/O DUMAS, no pain. Some anxiety. Ambulating with walker.           Medications:       albuterol  2 puff Inhalation 4x Daily     apixaban ANTICOAGULANT  5 mg Oral BID     atorvastatin  10 mg Oral QPM     azithromycin  250 mg Oral Daily     cefTRIAXone  1 g Intravenous Q24H     glipiZIDE  10 mg Oral QAM AC     glipiZIDE  5 mg Oral QPM     insulin aspart  1-7 Units Subcutaneous TID AC     insulin aspart  1-5 Units Subcutaneous At Bedtime     insulin glargine  15 Units Subcutaneous At Bedtime     irbesartan  300 mg Oral Daily     iron sucrose (VENOFER) intermittent infusion  200 mg Intravenous Daily     metoprolol tartrate  12.5 mg Oral BID     potassium chloride  20 mEq Oral BID     sodium chloride (PF)  3 mL Intracatheter Q8H     tamsulosin  0.8 mg Oral At Bedtime       - MEDICATION INSTRUCTIONS -       Current active medications and PTA medications reviewed, see medication list for details.            Physical Exam:   Vitals were reviewed  Patient Vitals for the past 24 hrs:   BP Temp Temp src Pulse Resp SpO2 Weight   11/13/20 1146 (!) 169/82 97.8  F (36.6  C) Oral 72 18 92 % --   11/13/20 0800 (!) 141/66 97.4  F (36.3  C) Oral 59 18 98 % --   11/13/20 0609 (!) 155/82 -- -- 58 18 95 % --   11/13/20 0600 -- 97.7  F (36.5  C) Oral -- -- -- --   11/13/20 0255 -- -- -- -- -- -- 130.4 kg (287 lb 8 oz)   11/13/20 0000 (!) 152/65 -- -- (!) 46 -- 96 % --   11/12/20 2356 -- 97.4   F (36.3  C) Oral (!) 47 20 -- --   20 2101 -- 97.8  F (36.6  C) Oral -- -- -- --   20 2100 (!) 135/92 -- -- 51 20 100 % --   20 1800 -- -- -- -- -- 96 % --   20 1550 (!) 165/66 97.6  F (36.4  C) Oral 75 18 -- --       Temp:  [97.4  F (36.3  C)-97.8  F (36.6  C)] 97.8  F (36.6  C)  Pulse:  [46-75] 72  Resp:  [18-20] 18  BP: (135-169)/(65-92) 169/82  SpO2:  [92 %-100 %] 92 %    Temperatures:  Current - Temp: 97.8  F (36.6  C); Max - Temp  Av.6  F (36.4  C)  Min: 97.4  F (36.3  C)  Max: 97.8  F (36.6  C)  Respiration range: Resp  Av.7  Min: 18  Max: 20  Pulse range: Pulse  Av.3  Min: 46  Max: 75  Blood pressure range: Systolic (24hrs), Av , Min:135 , Max:169   ; Diastolic (24hrs), Av, Min:65, Max:92    Pulse oximetry range: SpO2  Av.2 %  Min: 92 %  Max: 100 %    I/O last 3 completed shifts:  In: 1440 [P.O.:1440]  Out: 875 [Urine:875]      Intake/Output Summary (Last 24 hours) at 2020 1301  Last data filed at 2020 1152  Gross per 24 hour   Intake 960 ml   Output 850 ml   Net 110 ml       Not examined in person pending negative COVID test. Notes reviewed.      Wt Readings from Last 4 Encounters:   20 130.4 kg (287 lb 8 oz)          Data:          Lab Results   Component Value Date     2020     2020     2020    Lab Results   Component Value Date    CHLORIDE 90 2020    CHLORIDE 89 2020    CHLORIDE 90 2020    Lab Results   Component Value Date    BUN 36 2020    BUN 31 2020    BUN 29 2020      Lab Results   Component Value Date    POTASSIUM 3.7 2020    POTASSIUM 3.8 2020    POTASSIUM 3.6 2020    Lab Results   Component Value Date    CO2 23 2020    CO2 27 2020    CO2 25 2020    Lab Results   Component Value Date    CR 1.38 2020    CR 1.33 2020    CR 1.22 2020        Recent Labs   Lab Test 20  0710 20  1526   WBC 5.9  11.4*   HGB 9.8* 10.6*   HCT 28.5* 29.8*   MCV 85 85    270     Recent Labs   Lab Test 11/13/20  0635 11/11/20  1526   AST 67* 51*   * 104*   ALKPHOS 124 146   BILITOTAL 0.6 1.8*       Recent Labs   Lab Test 11/13/20  0635   MAG 2.8*     Recent Labs   Lab Test 11/13/20  0635   PHOS 2.5     Recent Labs   Lab Test 11/13/20  0635 11/12/20  0710 11/12/20  0201   MARISEL 9.0 8.8 8.5       Lab Results   Component Value Date    MARISEL 9.0 11/13/2020     Lab Results   Component Value Date    WBC 5.9 11/12/2020    HGB 9.8 (L) 11/12/2020    HCT 28.5 (L) 11/12/2020    MCV 85 11/12/2020     11/12/2020     Lab Results   Component Value Date     (L) 11/13/2020    POTASSIUM 3.7 11/13/2020    CHLORIDE 90 (L) 11/13/2020    CO2 23 11/13/2020    GLC 65 (L) 11/13/2020     Lab Results   Component Value Date    BUN 36 (H) 11/13/2020    CR 1.38 (H) 11/13/2020     Lab Results   Component Value Date    MAG 2.8 (H) 11/13/2020     Lab Results   Component Value Date    PHOS 2.5 11/13/2020       Creatinine   Date Value Ref Range Status   11/13/2020 1.38 (H) 0.66 - 1.25 mg/dL Final   11/12/2020 1.33 (H) 0.66 - 1.25 mg/dL Final   11/12/2020 1.22 0.66 - 1.25 mg/dL Final   11/11/2020 1.44 (H) 0.66 - 1.25 mg/dL Final       Attestation:  I have reviewed today's vital signs, notes, medications, labs and imaging.     Darrell Palmer MD

## 2020-11-13 NOTE — PLAN OF CARE
Pt a/o- forgetful at times. VSS, sats mid to upper 90's with 2L o2 via nasal cannula, RA 95%. PRN Tylenol given for c/o neck and shoulder pain. Lung sounds diminished in bilateral bases, DUMAS but pt denies this. BLE linette with +1-+2 edema. Covid negative, remaining in PUI precautions, re testing tomorrow. Monitoring NA+.

## 2020-11-13 NOTE — PHARMACY-RX INSURANCE COVERAGE
Anticoagulation coverage check.  Patient has BCBS [employer] through an employer.    Xarelto/Eliquis:  $106/mo. Upon receipt of RX Discharge [facility] can provide copay savings card to reduce this to $10/mo.    Jantoven (warfarin): $3/mo      -MALCOLM Muhammad, Pharmacy Technician/Liaison, Discharge Pharmacy [phone]

## 2020-11-13 NOTE — PLAN OF CARE
A&Ox4, forgetful, anxious at times. Denies pain. Lungs diminished, dyspnea on exertion, on RA. Tele AFIB 40's-50's. 1500 mL fluid restriction, pt needs reminders. Voiding adequate concentrated urine this shift. Pt refusing bed alarm; educated on fall risk, verbalized understanding but still will not comply. Ambulating with FWW, gait steady. Plan for another COVID test today, monitor Na level.

## 2020-11-13 NOTE — CONSULTS
Care Management Initial Consult    General Information  Assessment completed with: Dallin Oneil  Type of CM/SW Visit: Initial Assessment  Primary Care Provider verified and updated as needed: Yes   Readmission within the last 30 days: no previous admission in last 30 days      Reason for Consult: care coordination/care conference;discharge planning  Advance Care Planning:            Communication Assessment  Patient's communication style: spoken language (English or Bilingual)    Hearing Difficulty or Deaf: no   Wear Glasses or Blind: no    Cognitive  Cognitive/Neuro/Behavioral: WDL  Level of Consciousness: alert  Arousal Level: opens eyes spontaneously  Orientation: oriented x 4(forfetful )     Best Language: 0 - No aphasia  Speech: clear    Living Environment:   People in home: spouse  Amelia  Current living Arrangements: apartment      Able to return to prior arrangements:         Family/Social Support:  Care provided by: (P) self  Provides care for: (P) no one  Marital Status:   Wife  Wife       Description of Support System: Supportive;Involved         Current Resources:   Skilled Home Care Services:    Community Resources: None  Equipment currently used at home: shower chair;cane, straight;walker, standard  Supplies currently used at home:      Employment/Financial:  Employment Status: other (see comments)     Employment/ Comments: realestate , 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  Financial Concerns: No concerns identified           Lifestyle & Psychosocial Needs:        Socioeconomic History     Marital status:      Spouse name: Not on file     Number of children: Not on file     Years of education: Not on file     Highest education level: Not on file          Functional Status:  Prior to admission patient needed assistance: independent             Mental Health Status:          Chemical Dependency Status:                 Values/Beliefs:  Spiritual, Cultural Beliefs, Jew  Practices, Values that affect care:                 Additional Information:  Spoke with pt regarding recommendation for home OT at discharge. Disccussed roll of care coordinator. Discussed what home OT was about. Pt states he lives at home with his wife and his daughter visits often. Pt does not feel he needs home care at discharge. He understands that should he change his mind once he is home he would need to contact his primary care provider. Pt also states he will make his own PCP appointment after discharge.       Sherin Driscoll RN BAN  Inpatient Care Coordination  [address] [address]  [email or web address]   Office: [phone]  Fax: [phone]

## 2020-11-13 NOTE — PLAN OF CARE
Pt denies Chest pain or SOB, up independently in his room, rechecked today for COVID, VSS, Tele A-fib SVR, metoprolol held this AM,

## 2020-11-14 ENCOUNTER — APPOINTMENT (OUTPATIENT)
Dept: CARDIOLOGY | Facility: CLINIC | Age: 75
End: 2020-11-14
Attending: HOSPITALIST
Payer: COMMERCIAL

## 2020-11-14 ENCOUNTER — APPOINTMENT (OUTPATIENT)
Dept: OCCUPATIONAL THERAPY | Facility: CLINIC | Age: 75
End: 2020-11-14
Payer: COMMERCIAL

## 2020-11-14 LAB
ANION GAP SERPL CALCULATED.3IONS-SCNC: 9 MMOL/L (ref 3–14)
BUN SERPL-MCNC: 26 MG/DL (ref 7–30)
CALCIUM SERPL-MCNC: 8.6 MG/DL (ref 8.5–10.1)
CHLORIDE SERPL-SCNC: 95 MMOL/L (ref 94–109)
CO2 SERPL-SCNC: 24 MMOL/L (ref 20–32)
CREAT SERPL-MCNC: 1.14 MG/DL (ref 0.66–1.25)
GFR SERPL CREATININE-BSD FRML MDRD: 62 ML/MIN/{1.73_M2}
GLUCOSE BLDC GLUCOMTR-MCNC: 137 MG/DL (ref 70–99)
GLUCOSE BLDC GLUCOMTR-MCNC: 149 MG/DL (ref 70–99)
GLUCOSE BLDC GLUCOMTR-MCNC: 169 MG/DL (ref 70–99)
GLUCOSE BLDC GLUCOMTR-MCNC: 267 MG/DL (ref 70–99)
GLUCOSE BLDC GLUCOMTR-MCNC: 267 MG/DL (ref 70–99)
GLUCOSE SERPL-MCNC: 134 MG/DL (ref 70–99)
POTASSIUM SERPL-SCNC: 3.8 MMOL/L (ref 3.4–5.3)
SODIUM SERPL-SCNC: 128 MMOL/L (ref 133–144)

## 2020-11-14 PROCEDURE — 999N001017 HC STATISTIC GLUCOSE BY METER IP

## 2020-11-14 PROCEDURE — 250N000011 HC RX IP 250 OP 636: Performed by: INTERNAL MEDICINE

## 2020-11-14 PROCEDURE — 93306 TTE W/DOPPLER COMPLETE: CPT

## 2020-11-14 PROCEDURE — 210N000002 HC R&B HEART CARE

## 2020-11-14 PROCEDURE — 93306 TTE W/DOPPLER COMPLETE: CPT | Mod: 26 | Performed by: INTERNAL MEDICINE

## 2020-11-14 PROCEDURE — 250N000011 HC RX IP 250 OP 636: Performed by: HOSPITALIST

## 2020-11-14 PROCEDURE — 250N000013 HC RX MED GY IP 250 OP 250 PS 637: Performed by: HOSPITALIST

## 2020-11-14 PROCEDURE — 250N000013 HC RX MED GY IP 250 OP 250 PS 637: Performed by: INTERNAL MEDICINE

## 2020-11-14 PROCEDURE — 97535 SELF CARE MNGMENT TRAINING: CPT | Mod: GO | Performed by: OCCUPATIONAL THERAPIST

## 2020-11-14 PROCEDURE — 255N000002 HC RX 255 OP 636: Performed by: HOSPITALIST

## 2020-11-14 PROCEDURE — 250N000012 HC RX MED GY IP 250 OP 636 PS 637: Performed by: INTERNAL MEDICINE

## 2020-11-14 PROCEDURE — 36415 COLL VENOUS BLD VENIPUNCTURE: CPT | Performed by: INTERNAL MEDICINE

## 2020-11-14 PROCEDURE — 258N000003 HC RX IP 258 OP 636: Performed by: INTERNAL MEDICINE

## 2020-11-14 PROCEDURE — 99232 SBSQ HOSP IP/OBS MODERATE 35: CPT | Performed by: INTERNAL MEDICINE

## 2020-11-14 PROCEDURE — 80048 BASIC METABOLIC PNL TOTAL CA: CPT | Performed by: INTERNAL MEDICINE

## 2020-11-14 RX ORDER — SODIUM CHLORIDE 9 MG/ML
INJECTION, SOLUTION INTRAVENOUS CONTINUOUS
Status: ACTIVE | OUTPATIENT
Start: 2020-11-14 | End: 2020-11-14

## 2020-11-14 RX ORDER — AMLODIPINE BESYLATE 10 MG/1
10 TABLET ORAL DAILY
Status: DISCONTINUED | OUTPATIENT
Start: 2020-11-14 | End: 2020-11-15 | Stop reason: HOSPADM

## 2020-11-14 RX ADMIN — SODIUM CHLORIDE 100 ML/HR: 9 INJECTION, SOLUTION INTRAVENOUS at 13:09

## 2020-11-14 RX ADMIN — IRON SUCROSE 200 MG: 20 INJECTION, SOLUTION INTRAVENOUS at 09:15

## 2020-11-14 RX ADMIN — INSULIN ASPART 1 UNITS: 100 INJECTION, SOLUTION INTRAVENOUS; SUBCUTANEOUS at 16:23

## 2020-11-14 RX ADMIN — CYANOCOBALAMIN 1000 MCG: 1000 INJECTION, SOLUTION INTRAMUSCULAR at 17:19

## 2020-11-14 RX ADMIN — INSULIN ASPART 3 UNITS: 100 INJECTION, SOLUTION INTRAVENOUS; SUBCUTANEOUS at 19:00

## 2020-11-14 RX ADMIN — APIXABAN 5 MG: 5 TABLET, FILM COATED ORAL at 09:22

## 2020-11-14 RX ADMIN — HYDRALAZINE HYDROCHLORIDE 10 MG: 20 INJECTION INTRAMUSCULAR; INTRAVENOUS at 18:01

## 2020-11-14 RX ADMIN — TAMSULOSIN HYDROCHLORIDE 0.8 MG: 0.4 CAPSULE ORAL at 21:10

## 2020-11-14 RX ADMIN — ALBUTEROL SULFATE 2 PUFF: 90 AEROSOL, METERED RESPIRATORY (INHALATION) at 21:10

## 2020-11-14 RX ADMIN — HYDRALAZINE HYDROCHLORIDE 10 MG: 20 INJECTION INTRAMUSCULAR; INTRAVENOUS at 00:38

## 2020-11-14 RX ADMIN — INSULIN ASPART 1 UNITS: 100 INJECTION, SOLUTION INTRAVENOUS; SUBCUTANEOUS at 09:32

## 2020-11-14 RX ADMIN — HUMAN ALBUMIN MICROSPHERES AND PERFLUTREN 9 ML: 10; .22 INJECTION, SOLUTION INTRAVENOUS at 15:55

## 2020-11-14 RX ADMIN — INSULIN GLARGINE 12 UNITS: 100 INJECTION, SOLUTION SUBCUTANEOUS at 21:10

## 2020-11-14 RX ADMIN — Medication 1 TABLET: at 09:22

## 2020-11-14 RX ADMIN — CEFTRIAXONE SODIUM 1 G: 1 INJECTION, POWDER, FOR SOLUTION INTRAMUSCULAR; INTRAVENOUS at 21:09

## 2020-11-14 RX ADMIN — HYDRALAZINE HYDROCHLORIDE 10 MG: 20 INJECTION INTRAMUSCULAR; INTRAVENOUS at 21:20

## 2020-11-14 RX ADMIN — AMLODIPINE BESYLATE 10 MG: 10 TABLET ORAL at 09:22

## 2020-11-14 RX ADMIN — ALBUTEROL SULFATE 2 PUFF: 90 AEROSOL, METERED RESPIRATORY (INHALATION) at 17:19

## 2020-11-14 RX ADMIN — APIXABAN 5 MG: 5 TABLET, FILM COATED ORAL at 21:10

## 2020-11-14 RX ADMIN — AZITHROMYCIN MONOHYDRATE 250 MG: 250 TABLET ORAL at 09:22

## 2020-11-14 RX ADMIN — ATORVASTATIN CALCIUM 10 MG: 10 TABLET, FILM COATED ORAL at 21:20

## 2020-11-14 RX ADMIN — IRBESARTAN 300 MG: 150 TABLET ORAL at 09:22

## 2020-11-14 RX ADMIN — ALBUTEROL SULFATE 2 PUFF: 90 AEROSOL, METERED RESPIRATORY (INHALATION) at 13:16

## 2020-11-14 RX ADMIN — ALBUTEROL SULFATE 2 PUFF: 90 AEROSOL, METERED RESPIRATORY (INHALATION) at 09:27

## 2020-11-14 RX ADMIN — HYDRALAZINE HYDROCHLORIDE 10 MG: 20 INJECTION INTRAMUSCULAR; INTRAVENOUS at 14:58

## 2020-11-14 ASSESSMENT — ACTIVITIES OF DAILY LIVING (ADL)
ADLS_ACUITY_SCORE: 16

## 2020-11-14 ASSESSMENT — MIFFLIN-ST. JEOR: SCORE: 2031.27

## 2020-11-14 NOTE — PLAN OF CARE
Neuro- A&OX4, forgetful, anxious and easily irritable   Most Recent Vitals- Temp: 97.8  F (36.6  C) Temp src: Oral BP: (!) 173/66 Pulse: 74   Resp: 18 SpO2: 97 % O2 Device: None (Room air) Oxygen Delivery: 2 LPM  Tele/Cardiac- A-fib with CVR   Resp- RA   Activity- independently, refuses alarm   Pain- denies   Drips- NS 100cc/HR for x5 hours   Drains/Tubes- P-IV right wrist   Skin- BLE edema   GI/- WDL   Aggression Color- Green  COVID status- Negative  Plan-Prn Hydralazine given at 1458. Con't to monitor BP.    Misc-     Sherman Simpson RN

## 2020-11-14 NOTE — PLAN OF CARE
A&O x 4, forgetful at times. Denies CP/SOB. Up independent in room. Tele: Afib CVR. IV hydralazine x 1 for elevated BP. Blood glucose at 2 am 137. COVID negative. Will continue w/plan of care.

## 2020-11-14 NOTE — PROGRESS NOTES
Assessment and Plan:   Hyponatremia: received 500 ml NS yest. I/O 760/2000. Na: 124 > 128.     CKD-3: Cr 1.38 > 1.14.     Repeat 500 ml NS infusion. Recheck lab in am. Discharge in am per IM. He can F/U in our clinic after discharge (Danna Guzman NP, [facility], [phone]).             Interval History:   COVID-19 negative X 2.   On zithro and rocephin.     DM  HT: now on irbesartan, amlodipine.  Both at max dose.   ANGELITA on CPAP  BPH  Obesity     Anemia: On B12 IM, folgard po, venofer IV.        Review of Systems:   No chest pain or SOB. He feels good. Not on O2          Medications:       albuterol  2 puff Inhalation 4x Daily     amLODIPine  10 mg Oral Daily     apixaban ANTICOAGULANT  5 mg Oral BID     atorvastatin  10 mg Oral QPM     azithromycin  250 mg Oral Daily     cefTRIAXone  1 g Intravenous Q24H     cyanocobalamin  1,000 mcg Intramuscular Daily     folic acid-vit B6-vit B12  1 tablet Oral Daily     insulin aspart  1-7 Units Subcutaneous TID AC     insulin aspart  1-5 Units Subcutaneous At Bedtime     insulin glargine  12 Units Subcutaneous At Bedtime     irbesartan  300 mg Oral Daily     iron sucrose (VENOFER) intermittent infusion  200 mg Intravenous Daily     sodium chloride (PF)  3 mL Intracatheter Q8H     tamsulosin  0.8 mg Oral At Bedtime       - MEDICATION INSTRUCTIONS -       Current active medications and PTA medications reviewed, see medication list for details.            Physical Exam:   Vitals were reviewed  Patient Vitals for the past 24 hrs:   BP Temp Temp src Pulse Resp SpO2 Weight   11/14/20 1010 (!) 165/69 98.4  F (36.9  C) Oral 73 -- -- --   11/14/20 0922 (!) 190/84 -- -- -- -- -- --   11/14/20 0615 (!) 156/54 -- -- -- -- -- --   11/14/20 0436 -- -- -- -- -- -- 129.5 kg (285 lb 8 oz)   11/14/20 0031 (!) 178/65 97.4  F (36.3  C) Oral 67 18 94 % --   11/13/20 2134 (!) 180/90 97.8  F (36.6  C) Oral 60 22 95 % --   11/13/20 1529 (!) 171/66 97.7  F (36.5  C) Oral 64 22 95 % --    20 1146 (!) 169/82 97.8  F (36.6  C) Oral 72 18 92 % --       Temp:  [97.4  F (36.3  C)-98.4  F (36.9  C)] 98.4  F (36.9  C)  Pulse:  [60-73] 73  Resp:  [18-22] 18  BP: (156-190)/(54-90) 165/69  SpO2:  [92 %-95 %] 94 %    Temperatures:  Current - Temp: 98.4  F (36.9  C); Max - Temp  Av.8  F (36.6  C)  Min: 97.4  F (36.3  C)  Max: 98.4  F (36.9  C)  Respiration range: Resp  Av  Min: 18  Max: 22  Pulse range: Pulse  Av.2  Min: 60  Max: 73  Blood pressure range: Systolic (24hrs), Av , Min:156 , Max:190   ; Diastolic (24hrs), Av, Min:54, Max:90    Pulse oximetry range: SpO2  Av %  Min: 92 %  Max: 95 %    I/O last 3 completed shifts:  In: 520 [P.O.:420; I.V.:100]  Out: 2925 [Urine:2925]      Intake/Output Summary (Last 24 hours) at 2020 1129  Last data filed at 2020 1011  Gross per 24 hour   Intake 740 ml   Output 2725 ml   Net -1985 ml       Alert, not on O2  Lungs with clear BS  Cor irreg, nl S1 S2 no M  LE 1+ edema       Wt Readings from Last 4 Encounters:   20 129.5 kg (285 lb 8 oz)          Data:          Lab Results   Component Value Date     2020     2020     2020    Lab Results   Component Value Date    CHLORIDE 95 2020    CHLORIDE 90 2020    CHLORIDE 89 2020      Lab Results   Component Value Date    BUN 26 2020    BUN 36 2020    BUN 31 2020      Lab Results   Component Value Date    POTASSIUM 3.8 2020    POTASSIUM 3.7 2020    POTASSIUM 3.8 2020    Lab Results   Component Value Date    CO2 24 2020    CO2 23 2020    CO2 27 2020    Lab Results   Component Value Date    CR 1.14 2020    CR 1.38 2020    CR 1.33 2020        Recent Labs   Lab Test 20  0710 20  1526   WBC 5.9 11.4*   HGB 9.8* 10.6*   HCT 28.5* 29.8*   MCV 85 85    270     Recent Labs   Lab Test 20  0635 20  1526   AST 67* 51*   * 104*    ALKPHOS 124 146   BILITOTAL 0.6 1.8*       Recent Labs   Lab Test 11/13/20  0635   MAG 2.8*     Recent Labs   Lab Test 11/13/20  0635   PHOS 2.5     Recent Labs   Lab Test 11/14/20  0636 11/13/20  0635 11/12/20  0710   MARISEL 8.6 9.0 8.8     Lab Results   Component Value Date    MARISEL 8.6 11/14/2020     Lab Results   Component Value Date    WBC 5.9 11/12/2020    HGB 9.8 (L) 11/12/2020    HCT 28.5 (L) 11/12/2020    MCV 85 11/12/2020     11/12/2020     Lab Results   Component Value Date     (L) 11/14/2020    POTASSIUM 3.8 11/14/2020    CHLORIDE 95 11/14/2020    CO2 24 11/14/2020     (H) 11/14/2020     Lab Results   Component Value Date    BUN 26 11/14/2020    CR 1.14 11/14/2020     Lab Results   Component Value Date    MAG 2.8 (H) 11/13/2020     Lab Results   Component Value Date    PHOS 2.5 11/13/2020       Creatinine   Date Value Ref Range Status   11/14/2020 1.14 0.66 - 1.25 mg/dL Final   11/13/2020 1.38 (H) 0.66 - 1.25 mg/dL Final   11/12/2020 1.33 (H) 0.66 - 1.25 mg/dL Final   11/12/2020 1.22 0.66 - 1.25 mg/dL Final   11/11/2020 1.44 (H) 0.66 - 1.25 mg/dL Final       Attestation:  I have reviewed today's vital signs, notes, medications, labs and imaging.     Darrell Palmer MD

## 2020-11-14 NOTE — PROGRESS NOTES
[facility]    Medicine Progress Note - Hospitalist Service       Date of Admission:  11/11/2020  Assessment & Plan       Dallin Mckeon is a 75 year old male with medical history significant for DM 2, HTN, HL, ANGELITA, BPH, CKD stage II, morbid obesity was sent to the ER from urgent care for evaluation of above symptoms.  Patient was febrile and hypoxic and is being admitted on 11/11/2020 for possible COVID-19/pneumonia.     Bilateral pneumonia  Hypoxic respiratory failure- improved  COVID-19 ruled out  Patient with dry cough, myalgia, fatigue, dyspnea and hypoxia and fever of 101.8.  CT chest shows bilateral groundglass opacity.  Highly concerning for COVID-19.  His procalcitonin is also mildly elevated at 0.72.  Mild leukocytosis -WBC 11.4.  Inflammatory markers including CRP as well as LFTs elevated. Lactic acid borderline 2.1.  - started on ceftriaxone/Zithromax on admission; also received Decadron on admission given concern for COVID-19 infection  - proBNP elevated on admission 89869 but he does not look overt fluid overload;  - COVID- 19 negativeX2; discontinue special precautions   - Echo pending  - supplemental O2, IS, tylenol prn  - doing fine, SOB improved, afebrile, weaned off O2 now  - WBCs down to 5.9  - continue Ceftriaxone/Zithromax  - PT/OT- rec discharge home with home OT but apparently he feels he does not need HHC at discharge, as per CC note    Hyponatremia- improving  - on admission Na 124- likely multifactorial- related to PTA chlorthalidone, prerenal azotemia  - has h/o hyponatremia  - TSH 2.12, urine osm 462, urine Na 7  - received some iv fluids since admission; PTA diuretic held  - serial Na 123--122  - Nephology consult appreciated; bolus NS 500cc ordered on 11/12  - Na improving 124--128 today  - another bolus NS 500cc today as per Nephrology  - currently on fluid restriction 1500cc/day  - repeat BMP in am  - follow up with Nephrology as outpatient      Atrial  fibrillation, new onset  -  Rate controlled and in fact in 50s-60s.  Denies palpitation, syncope.  Chads 2 vascular score of minimum 5 (age-2, HTN-1, DM-1, CAD-1) pending 2D echo.  Annual risk of CVA 7.2%.   - TSH OK  - Echo pending  - PTA Atenolol stopped and started on Metoprolol 25 mg po BID on admission; HR was low in am in mid 40's; decrease Metoprolol to 12.5 mg po BID with holding parameters; Metoprolol still not given because HR<60; will stop BB now  - Tele- Afib with HR in 60's  - started on Apixaban  - pharm liaison consult for med coverage   - follow up with Cardiology as outpatient    Hyperlipidemia, Hypertension   [PTA on Atenolol 50 mg po daily, Avapro 300 mg po daily, chlorthalidone 50 mg po daily and Lovastatin 40 mg po daily]  - PTA Atenolol switched to Metoprolol, continue PTA Avapro, PTA diuretic held because of hyponatremia  - BP elevated, Metoprolol not given because of slow HR;  discontinued Metoprolol and started Norvasc 5 mg po daily; BP still elevated -170's; increase Norvasc to 10 mg po daily today  - continue Avapro 300 mg po daily  - hydralazine iv prn   - resumed PTA statin  - monitor BP, adjust meds as needed    Anemia, acute on chronic  His hemoglobin was 12.4 last year.  Presents with hemoglobin of 10.6--9.8 this am;  Denies hematochezia or melena.  - He will be on anticoagulation for A. Fib.  - iron studies- iron 17, , TY 7  - venofer iv daily ordered by Nephrology  - Screening colonoscopy/EGD if found to be iron deficiency given that he will be on anticoagulation now.    Vitamin B12 deficiency  - B12 level 176  - started on Vit B12 1000mcg im daily for 3 doses     TESS on CKD stage 2   Noted his creatinine is 1.1-1.2 earlier this year.  He presents with a creatinine of 1.4.  Suspect hypertensive/diabetic nephropathy.  He is on ARB.  Unfortunately has gotten contrast for CT PE study in ER.  - started on mild iv hydration  - PTA diuretic held  - cr  1.22--1.33--1.38--1.14  - avoid nephrotoxic drugs  - BMP in am  - Monitor for fluid overload  - Follow intake and output, daily weight.     Mild transaminitis  No abdominal pain.  Denies heavy alcohol use.  Could be due to underlying viral infection.  - Monitor (cephalosporin can worsen it)  - If abdominal pain or worsened LFTs, will check hepatitis virus panel and also right upper quadrant ultrasound.     BPH  - Continue PTA Flomax, monitor for urinary retention.     DM type 2  [PTA on Glipizide 10 mg qam and 6 mg qpm, Metformin 1000 mg po BID and Lantus 20 units at bedtime]  - HbA1c 6.6  - on admission- PTA Glipizide resumed and PTA Metformin held   - also resumed PTA Lantus at a lower dose 10 units at bedtime; --236 yesterday; increased lantus to 15 units at bedtime;   - BSs low yesterday 65--71  - stopped Glipizide; decreased Lantus to 12 units at bedtime  - BS are fine today 134--149  - Moderate insulin resistance sliding scale.  - diabetic diet     ANGELITA on CPAP  Morbid obesity   - Resume CPAP per home setting  -Weight management as outpatient per PCP      Diet:  Moderate carbohydrate  DVT Prophylaxis: Apixaban  Renee Catheter: not present  Code Status:  Full code, discussed with patient      Disposition Plan   Expected discharge: Tomorrow, recommended to prior living arrangement once with home care? Na improved.  Entered: Aaliyah Pfeiffer MD 11/14/2020, 12:32 PM       The patient's care was discussed with the Bedside Nurse, Patient and Patient's Family- wife at bedside..    Aaliyah Pfeiffer MD  Hospitalist Service  [facility]  Contact information available via [facility] Paging/Directory    ______________________________________________________________________    Interval History   Feeling fine, denies SOB, no coughing, no chest pain, no N/V, no abd pain.  - frustrated again because the infusion pump was beeping again in am and made him upset which cause elevated blood  pressures.     Data reviewed today: I reviewed all medications, new labs and imaging results over the last 24 hours. I personally reviewed no images or EKG's today.    Physical Exam   Vital Signs: Temp: 98.4  F (36.9  C) Temp src: Oral BP: (!) 165/69 Pulse: 73   Resp: 18 SpO2: 94 % O2 Device: None (Room air) Oxygen Delivery: 2 LPM  Weight: 285 lbs 8 oz  Constitutional:             awake, alert, NAD.   HENT:                         Normocephalic, atraumatic, PERRL  Neck:                           Normal range of motion. Neck supple.                                       No tracheal deviation present. No thyromegaly present.   Cardiovascular:           irregularly irregular,  no murmurs, no rubs  Pulmonary/Chest:       bilateral air entry, no wheezing, no rales, no crackles.   Abdominal:                  Soft, obese, nonT, BS present. There is no rebound and no guarding.   Musculoskeletal:         No joint deformities:.   Lymphadenopathy:     No cervical adenopathy.   Neurological:               Alert and oriented to person, place, and time.   Skin:                            Skin is warm and dry. No rash noted. No pallor.   Extremities:                Trace-1+ pretibial edema bilaterally.    Data   Recent Labs   Lab 11/14/20  0636 11/13/20  0635 11/12/20  0710 11/11/20  1526 11/11/20  1526   WBC  --   --  5.9  --  11.4*   HGB  --   --  9.8*  --  10.6*   MCV  --   --  85  --  85   PLT  --   --  256  --  270   * 124* 122*   < > 124*   POTASSIUM 3.8 3.7 3.8   < > 3.7   CHLORIDE 95 90* 89*   < > 90*   CO2 24 23 27   < > 24   BUN 26 36* 31*   < > 29   CR 1.14 1.38* 1.33*   < > 1.44*   ANIONGAP 9 11 6   < > 10   MARISEL 8.6 9.0 8.8   < > 8.8   * 65* 216*   < > 177*   ALBUMIN  --  3.1*  --   --  3.4   PROTTOTAL  --  7.1  --   --  7.5   BILITOTAL  --  0.6  --   --  1.8*   ALKPHOS  --  124  --   --  146   ALT  --  105*  --   --  104*   AST  --  67*  --   --  51*   TROPI  --   --   --   --  <0.015    < > = values in  this interval not displayed.     No results found for this or any previous visit (from the past 24 hour(s)).  Medications     - MEDICATION INSTRUCTIONS -       sodium chloride         albuterol  2 puff Inhalation 4x Daily     amLODIPine  10 mg Oral Daily     apixaban ANTICOAGULANT  5 mg Oral BID     atorvastatin  10 mg Oral QPM     azithromycin  250 mg Oral Daily     cefTRIAXone  1 g Intravenous Q24H     cyanocobalamin  1,000 mcg Intramuscular Daily     folic acid-vit B6-vit B12  1 tablet Oral Daily     insulin aspart  1-7 Units Subcutaneous TID AC     insulin aspart  1-5 Units Subcutaneous At Bedtime     insulin glargine  12 Units Subcutaneous At Bedtime     irbesartan  300 mg Oral Daily     iron sucrose (VENOFER) intermittent infusion  200 mg Intravenous Daily     sodium chloride (PF)  3 mL Intracatheter Q8H     tamsulosin  0.8 mg Oral At Bedtime

## 2020-11-14 NOTE — PLAN OF CARE
PT: Orders received. Chart reviewed and discussed with care team.  PT not indicated due to patient being near baseline mobility and needs being addressed by OT during this hospital stay.  Defer discharge recommendations to OT.  Will complete orders.

## 2020-11-15 VITALS
DIASTOLIC BLOOD PRESSURE: 69 MMHG | WEIGHT: 286.6 LBS | HEART RATE: 86 BPM | HEIGHT: 70 IN | SYSTOLIC BLOOD PRESSURE: 163 MMHG | TEMPERATURE: 97.5 F | OXYGEN SATURATION: 94 % | BODY MASS INDEX: 41.03 KG/M2 | RESPIRATION RATE: 16 BRPM

## 2020-11-15 LAB
ANION GAP SERPL CALCULATED.3IONS-SCNC: 10 MMOL/L (ref 3–14)
BUN SERPL-MCNC: 23 MG/DL (ref 7–30)
CALCIUM SERPL-MCNC: 8.5 MG/DL (ref 8.5–10.1)
CHLORIDE SERPL-SCNC: 93 MMOL/L (ref 94–109)
CO2 SERPL-SCNC: 23 MMOL/L (ref 20–32)
CREAT SERPL-MCNC: 1.17 MG/DL (ref 0.66–1.25)
GFR SERPL CREATININE-BSD FRML MDRD: 60 ML/MIN/{1.73_M2}
GLUCOSE BLDC GLUCOMTR-MCNC: 176 MG/DL (ref 70–99)
GLUCOSE BLDC GLUCOMTR-MCNC: 190 MG/DL (ref 70–99)
GLUCOSE BLDC GLUCOMTR-MCNC: 265 MG/DL (ref 70–99)
GLUCOSE SERPL-MCNC: 183 MG/DL (ref 70–99)
POTASSIUM SERPL-SCNC: 3.5 MMOL/L (ref 3.4–5.3)
SODIUM SERPL-SCNC: 126 MMOL/L (ref 133–144)

## 2020-11-15 PROCEDURE — 99239 HOSP IP/OBS DSCHRG MGMT >30: CPT | Performed by: INTERNAL MEDICINE

## 2020-11-15 PROCEDURE — 80048 BASIC METABOLIC PNL TOTAL CA: CPT | Performed by: INTERNAL MEDICINE

## 2020-11-15 PROCEDURE — 250N000013 HC RX MED GY IP 250 OP 250 PS 637: Performed by: INTERNAL MEDICINE

## 2020-11-15 PROCEDURE — 250N000011 HC RX IP 250 OP 636: Performed by: INTERNAL MEDICINE

## 2020-11-15 PROCEDURE — 258N000003 HC RX IP 258 OP 636: Performed by: INTERNAL MEDICINE

## 2020-11-15 PROCEDURE — 250N000013 HC RX MED GY IP 250 OP 250 PS 637: Performed by: HOSPITALIST

## 2020-11-15 PROCEDURE — 36415 COLL VENOUS BLD VENIPUNCTURE: CPT | Performed by: INTERNAL MEDICINE

## 2020-11-15 PROCEDURE — 999N001017 HC STATISTIC GLUCOSE BY METER IP

## 2020-11-15 RX ORDER — AMLODIPINE BESYLATE 10 MG/1
10 TABLET ORAL DAILY
Qty: 30 TABLET | Refills: 0 | Status: SHIPPED | OUTPATIENT
Start: 2020-11-16

## 2020-11-15 RX ORDER — CEPHALEXIN 500 MG/1
500 CAPSULE ORAL 3 TIMES DAILY
Qty: 9 CAPSULE | Refills: 0 | Status: SHIPPED | OUTPATIENT
Start: 2020-11-15 | End: 2020-11-18

## 2020-11-15 RX ADMIN — Medication 1 TABLET: at 08:19

## 2020-11-15 RX ADMIN — INSULIN ASPART 1 UNITS: 100 INJECTION, SOLUTION INTRAVENOUS; SUBCUTANEOUS at 09:29

## 2020-11-15 RX ADMIN — APIXABAN 5 MG: 5 TABLET, FILM COATED ORAL at 08:19

## 2020-11-15 RX ADMIN — ALBUTEROL SULFATE 2 PUFF: 90 AEROSOL, METERED RESPIRATORY (INHALATION) at 08:24

## 2020-11-15 RX ADMIN — AMLODIPINE BESYLATE 10 MG: 10 TABLET ORAL at 08:19

## 2020-11-15 RX ADMIN — ALBUTEROL SULFATE 2 PUFF: 90 AEROSOL, METERED RESPIRATORY (INHALATION) at 14:54

## 2020-11-15 RX ADMIN — IRON SUCROSE 200 MG: 20 INJECTION, SOLUTION INTRAVENOUS at 08:19

## 2020-11-15 RX ADMIN — INSULIN ASPART 3 UNITS: 100 INJECTION, SOLUTION INTRAVENOUS; SUBCUTANEOUS at 14:53

## 2020-11-15 RX ADMIN — AZITHROMYCIN MONOHYDRATE 250 MG: 250 TABLET ORAL at 08:19

## 2020-11-15 RX ADMIN — IRBESARTAN 300 MG: 150 TABLET ORAL at 08:19

## 2020-11-15 ASSESSMENT — ACTIVITIES OF DAILY LIVING (ADL)
ADLS_ACUITY_SCORE: 15
ADLS_ACUITY_SCORE: 15
ADLS_ACUITY_SCORE: 16
ADLS_ACUITY_SCORE: 15

## 2020-11-15 ASSESSMENT — MIFFLIN-ST. JEOR: SCORE: 2036.25

## 2020-11-15 NOTE — PROGRESS NOTES
Na 126 today. Cr 1.17. K 3.5. UO yest 2200.   Pt on amlodipine, irbesartan.     OK for discharge on current meds. Per IM.    No thiazide diuretic. Cont 1500 ml/d fluid restriction.   He can F/U in our clinic after discharge (Danna Guzman NP, [facility], [phone]).

## 2020-11-15 NOTE — DISCHARGE SUMMARY
[facility]  Hospitalist Discharge Summary      Date of Admission:  11/11/2020  Date of Discharge:  11/15/2020  3:55 PM  Discharging Provider: Aaliyah Pfeiffer MD      Discharge Diagnoses   Bilateral pneumonia  Acute Hypoxic respiratory failure- resolved  COVID-19 ruled out  Hyponatremia- improving  Atrial fibrillation, new diagnosis  [place] on chronic anemia  TESS on CKD stage 2  HTN  HLP  DM type 2  ANGELITA  Morbid Obesity      Follow-ups Needed After Discharge   Follow-up Appointments     Follow-up and recommended labs and tests       Follow up with primary care provider, Gail Holden, within 7 days for   hospital follow- up.  The following labs/tests are recommended: BMP.  Outpatient EGD/coloscopy recommended given his anemia.  Follow up with cardiology in 2-3 weeks.  Follow up with Nephrology in 2 weeks- (Danna Guzman NP, [facility],   [phone]).             Unresulted Labs Ordered in the Past 30 Days of this Admission     Date and Time Order Name Status Description    11/11/2020 1506 Blood culture Preliminary     11/11/2020 1506 Blood culture Preliminary       These results will be followed up by PCP    Discharge Disposition   Discharged to home  Condition at discharge: Satisfactory      Hospital Course   Dallin Mckeon is a 75 year old male with medical history significant for DM 2, HTN, HL, ANGELITA, BPH, CKD stage II, morbid obesity was sent to the ER from urgent care for evaluation fever and shortness of breath; for a detailed HPI- please refer to h&P done by Dr Neena Marti on 11/11/2020.    Bilateral pneumonia  Acute Hypoxic respiratory failure-resolved  COVID-19 ruled out  Patient with dry cough, myalgia, fatigue, dyspnea and hypoxia and fever of 101.8.  CT chest shows bilateral groundglass opacity. His procalcitonin is also mildly elevated at 0.72.  Mild leukocytosis -WBC 11.4.  Inflammatory markers including CRP as well as LFTs elevated. Lactic acid borderline 2.1.  - started  on ceftriaxone/Zithromax on admission; also received Decadron on admission given concern for COVID-19 infection  - proBNP elevated on admission 90450 but he does not look overt fluid overload;  - COVID- 19 negativeX2; discontinued special precautions   - Echo- EF 60-65%. No regional wall motion abnormalities noted; severe biatrial enlargement  - supplemental O2, IS, tylenol prn  - reported feeling much better, SOB improved, afebrile, weaned off O2 now  - WBCs down to 5.9  - will discharge on Keflex/Zithromax  - PT/OT- rec discharge home with home OT but he feels he does not need HHC at discharge, as per CC note     Hyponatremia- improving  - has h/o hyponatremia (in [facility] Na was 129--132 in 2019)  - on admission Na 124- likely multifactorial- related to PTA chlorthalidone, prerenal azotemia  - TSH 2.12, urine osm 462, urine Na 7  - received some iv fluids since admission; PTA diuretic held  - Nephology consult appreciated; bolus NS 500cc ordered on 11/12 and 11/14 as per Nephrology  - Na improving 124--128-126  - Nephrology was OK with him going home today  - PTA Chlorthalidone was discontinued; he will continue with fluid restriction 60833hd/day and follow up with PMD and Nephrology     Atrial fibrillation, new diagnosis  - noted to be in AFib in ER; new diagnosis; states that his brother also has h/o Afib  - PTA on Atenolol; here initially switched from Atenolol to Metoprolol but his HR was in mid40's-50's and Metoprolol had been held  - Tele- he remained in Afib with rate controlled  - CHADSVASC2 score 5 (age-2, HTN-1, DM-1, CAD-1).  Annual risk of CVA 7.2%.   - he was started on Apixaban 5 mg po BID  - TSH OK  - Echo with  60-65%. No regional wall motion abnormalities noted; severe biatrial enlargement  - will resume PTA Atenolol at the time of the discharge    - follow up with Cardiology as outpatient     Hyperlipidemia, Hypertension   [PTA on Atenolol 50 mg po daily, Avapro 300 mg po daily,  "chlorthalidone 50 mg po daily and Lovastatin 40 mg po daily]  - initially PTA Atenolol switched to Metoprolol, continue PTA Avapro, PTA diuretic held because of hyponatremia  - BP elevated, Metoprolol not given because of slow HR;  discontinued Metoprolol and started Norvasc 5 mg po daily; BP still elevated -170's; increase Norvasc to 10 mg po daily  - BP on higher side at times, he describes \"white coat syndrome\" as he becomes anxious   - will discharge on Atenolol 50 mg po daily, Avapro 300 mg po daily and Norvasc 5 mg po daily  - stop Chlorthalidone after discharge given hyponatremia  - resumed PTA statin     Anemia, acute on chronic  His hemoglobin was 12.4 last year.  Presents with hemoglobin of 10.6--9.8 this am;  Denies hematochezia or melena.  - He will be on anticoagulation for A. Fib.  - iron studies- iron 17, , TY 7  - venofer iv daily ordered by Nephrology  - Screening colonoscopy/EGD as outpatient given the fact that he will be on anticoagulation.     Vitamin B12 deficiency  - B12 level 176  - started on Vit B12 1000mcg im daily for 3 doses     TESS on CKD stage 2   Noted his creatinine is 1.1-1.2 earlier this year.  He presents with a creatinine of 1.4.  Suspected prerenal  - PTA diuretic held  - revieved few small boluses NS  - cr improved 1.33--1.38--1.14--1.17  - follow up with Nephrology as outpatient     Mild transaminitis  No abdominal pain.  Denies heavy alcohol use.  Could be due to underlying viral infection.  - Monitor      BPH  - Continue PTA Flomax    DM type 2  - continue PTA Glipizide 10 mg qam and 6 mg qpm, Metformin 1000 mg po BID and Lantus 20 units at bedtime  - HbA1c 6.6.  - diabetic diet     ANGELITA on CPAP  Morbid obesity   - Resume CPAP per home setting  - Weight management as outpatient per PCP        Consultations This Hospital Stay   PHYSICAL THERAPY ADULT IP CONSULT  OCCUPATIONAL THERAPY ADULT IP CONSULT  SOCIAL WORK IP CONSULT  NEPHROLOGY IP CONSULT  CARE MANAGEMENT " / SOCIAL WORK IP CONSULT  CARE MANAGEMENT / SOCIAL WORK IP CONSULT  PHARMACY LIAISON FOR MEDICATION COVERAGE CONSULT    Code Status   Full Code    Time Spent on this Encounter   I, Aaliyah Pfeiffer MD, personally saw the patient today and spent greater than 30 minutes discharging this patient.       Aaliyah Pfeiffer MD  [facility]ART [facility]  [facility] [address]  [place] [address]  Phone: [phone]  ______________________________________________________________________    Physical Exam   Vital Signs: Temp: 97.5  F (36.4  C) Temp src: Oral BP: (!) 163/69 Pulse: 86   Resp: 16 SpO2: 94 % O2 Device: None (Room air)    Weight: 286 lbs 9.57 oz  Constitutional:             awake, alert, NAD.   HENT:                         Normocephalic, atraumatic, PERRL  Neck:                           Normal range of motion. Neck supple.                                       No tracheal deviation present. No thyromegaly present.   Cardiovascular:           irregularly irregular,  no murmurs, no rubs  Pulmonary/Chest:       bilateral air entry, no wheezing, no rales, no crackles.   Abdominal:                  Soft, obese, nonT, BS present. There is no rebound and no guarding.   Musculoskeletal:         No joint deformities:.   Lymphadenopathy:     No cervical adenopathy.   Neurological:               Alert and oriented to person, place, and time.   Skin:                            Skin is warm and dry. No rash noted. No pallor.   Extremities:                1+ pretibial edema bilaterally.          Primary Care Physician   Gail Holden    Discharge Orders      Reason for your hospital stay    Pneumonia     Activity    Your activity upon discharge: activity as tolerated     When to contact your care team    Call your primary doctor or return to ERif you have any of the following: temperature greater than 100.5 or less than 96, chills, increased shortness of breath, cough, increased swelling,  abnormal bleeding- bloody stools, black stools, bloody urine, dizziness, loss of consciousness, falls.     Follow-up and recommended labs and tests     Follow up with primary care provider, Gail Holden, within 7 days for hospital follow- up.  The following labs/tests are recommended: BMP.    Follow up with cardiology in 2-3 weeks.  Follow up with Nephrology in 2 weeks- (Danna Guzman NP, [facility], [phone]).     Full Code     Diet    Follow this diet upon discharge: Orders Placed This Encounter      Fluid restriction 1500 ML FLUID      Moderate Consistent CHO Diet       Significant Results and Procedures   Most Recent 3 CBC's:  Recent Labs   Lab Test 11/12/20  0710 11/11/20  1526   WBC 5.9 11.4*   HGB 9.8* 10.6*   MCV 85 85    270     Most Recent 3 BMP's:  Recent Labs   Lab Test 11/15/20  0542 11/14/20  0636 11/13/20  0635   * 128* 124*   POTASSIUM 3.5 3.8 3.7   CHLORIDE 93* 95 90*   CO2 23 24 23   BUN 23 26 36*   CR 1.17 1.14 1.38*   ANIONGAP 10 9 11   MARISEL 8.5 8.6 9.0   * 134* 65*     Most Recent 2 LFT's:  Recent Labs   Lab Test 11/13/20  0635 11/11/20  1526   AST 67* 51*   * 104*   ALKPHOS 124 146   BILITOTAL 0.6 1.8*     Most Recent 3 INR's:No lab results found.  Most Recent 3 Creatinines:  Recent Labs   Lab Test 11/15/20  0542 11/14/20  0636 11/13/20  0635   CR 1.17 1.14 1.38*     Most Recent 3 Troponin's:  Recent Labs   Lab Test 11/11/20  1526   TROPI <0.015     Most Recent TSH and T4:  Recent Labs   Lab Test 11/11/20  1526   TSH 2.12     Most Recent Urinalysis:  Recent Labs   Lab Test 11/11/20  1909   COLOR Yellow   APPEARANCE Clear   URINEGLC Negative   URINEBILI Negative   URINEKETONE Negative   SG 1.027   UBLD Trace*   URINEPH 5.5   PROTEIN 100*   NITRITE Negative   LEUKEST Negative   RBCU 1   WBCU 1   ,   Results for orders placed or performed during the hospital encounter of 11/11/20   CT Chest Pulmonary Embolism w Contrast    Narrative    CT CHEST PULMONARY EMBOLISM  WITH CONTRAST 2020 5:34 PM    CLINICAL HISTORY: Check for pulmonary embolism or pneumonia, SOB,  fever, elevated D-dimer.    TECHNIQUE: CT angiogram chest during arterial phase injection IV  contrast. 2D and 3D MIP reconstructions were performed by the CT  technologist. Dose reduction techniques were used.     CONTRAST: 80 mL Isovue-370    COMPARISON: None.    FINDINGS:  ANGIOGRAM CHEST: Pulmonary arteries are normal caliber and negative  for pulmonary emboli. Thoracic aorta is negative for dissection. No CT  evidence of right heart strain.    LUNGS AND PLEURA: Trace pleural fluid bilaterally. Interstitial and  groundglass infiltrates likely present, somewhat limited detail due to  motion artifact.    MEDIASTINUM/AXILLAE: A few mildly prominent lymph nodes are noted,  likely reactive in this setting.    UPPER ABDOMEN: No acute findings in the visualized upper abdomen.    MUSCULOSKELETAL: No suspicious bony lesions.      Impression    IMPRESSION:  1.  No pulmonary embolism demonstrated.  2.  Moderate to severe interstitial and groundglass infiltrates which  are nonspecific.  3.  Trace pleural fluid bilaterally.    CHARANJIT GARCIA MD   Echocardiogram Complete    Narrative    [id number]  [id number]  [id number]^MITCHELL^AGUSTIN^R           [facility]  Echocardiography Laboratory  [address] [place] [address]        Name: JOY ERICKSON  MRN: [id number]  : 1945  Study Date: 2020 02:58 PM  Age: 75 yrs  Gender: Male  Patient Location: [facility]  Reason For Study: Atrial Fibrillation  Ordering Physician: AGUSTIN MEDRANO  Referring Physician: Adelita Ref-Primary,Physician  Performed By: Chela Tijerina     BSA: 2.4 m2  Height: 70 in  Weight: 285 lb  BP: 177/75 mmHg  _____________________________________________________________________________  __        Procedure  Complete Portable Echo Adult. Optison (NDC #1154-3176) given  intravenously.  _____________________________________________________________________________  __        Interpretation Summary     The rhythm was atrial fibrillation.  Mild concentric left ventricular hypertrophy.  The visual ejection fraction is estimated at 60-65%.  No regional wall motion abnormalities noted.  The right ventricle is normal in size and function.  There is severe biatrial enlargement.  Mild tricuspid valve regurgitation. The right ventricular systolic pressure is  approximated at 44.4 mmHg plus the right atrial pressure. Systemic /75  mmHg.  Trace mitral valve regurgitation.     There is no comparison study available.  _____________________________________________________________________________  __        Left Ventricle  The left ventricle is normal in size. There is mild concentric left  ventricular hypertrophy. Left ventricular systolic function is normal. The  visual ejection fraction is estimated at 60-65%. Diastolic function not  assessed due to atrial fibrillation. No regional wall motion abnormalities  noted.     Right Ventricle  The right ventricle is normal in size and function.     Atria  There is severe biatrial enlargement. There is no color Doppler evidence of an  atrial shunt.     Mitral Valve  The mitral valve leaflets appear normal. There is no evidence of stenosis,  fluttering, or prolapse. There is mild mitral annular calcification. There is  trace mitral regurgitation.        Tricuspid Valve  Normal tricuspid valve. There is mild (1+) tricuspid regurgitation. The right  ventricular systolic pressure is approximated at 44.4 mmHg plus the right  atrial pressure.     Aortic Valve  The aortic valve is trileaflet with aortic valve sclerosis. No aortic  regurgitation is present. No hemodynamically significant valvular aortic  stenosis.     Pulmonic Valve  Normal pulmonic valve. There is trace pulmonic valvular regurgitation.     Vessels  The aortic root is normal size. The  ascending aorta is Borderline dilated. The  inferior vena cava cannot be assessed.     Pericardium  There is no pericardial effusion.        Rhythm  The rhythm was atrial fibrillation.  _____________________________________________________________________________  __  MMode/2D Measurements & Calculations     IVSd: 1.1 cm  LVIDd: 5.8 cm  LVIDs: 3.1 cm  LVPWd: 1.2 cm  FS: 46.2 %  LV mass(C)d: 279.0 grams  LV mass(C)dI: 115.0 grams/m2  Ao root diam: 3.1 cm  LA dimension: 5.1 cm  asc Aorta Diam: 3.8 cm  LA/Ao: 1.6  LA Volume (BP): 81.4 ml  LA Volume Index (BP): 33.5 ml/m2  RWT: 0.41           Doppler Measurements & Calculations  MV E max cesario: 146.6 cm/sec  MV dec slope: 756.9 cm/sec2  MV dec time: 0.25 sec  PA acc time: 0.06 sec  TR max cesario: 332.7 cm/sec  TR max P.4 mmHg  E/E' avg: 15.7  Lateral E/e': 11.9  Medial E/e': 19.5           _____________________________________________________________________________  __           Report approved by: Dr Nemesio Chowdhury 2020 04:24 PM            Discharge Medications   Current Discharge Medication List      START taking these medications    Details   amLODIPine (NORVASC) 10 MG tablet Take 1 tablet (10 mg) by mouth daily  Qty: 30 tablet, Refills: 0    Comments: Future refills by PCP Dr. Gail Holden with phone number [phone].  Associated Diagnoses: Benign essential hypertension      apixaban ANTICOAGULANT (ELIQUIS) 5 MG tablet Take 1 tablet (5 mg) by mouth 2 times daily  Qty: 60 tablet, Refills: 0    Comments: Future refills by PCP Dr. Gail Holden with phone number [phone].  Associated Diagnoses: New onset atrial fibrillation (H)      cephALEXin (KEFLEX) 500 MG capsule Take 1 capsule (500 mg) by mouth 3 times daily for 3 days  Qty: 9 capsule, Refills: 0    Associated Diagnoses: Pneumonia of both lower lobes due to infectious organism      folic acid-vit B6-vit B12 (FOLGARD) 0.8-10-0.115 MG TABS per tablet Take 1 tablet by mouth daily  Qty: 30 tablet,  Refills: 0    Comments: Future refills by PCP Dr. Gail Holden with phone number [phone].  Associated Diagnoses: Vitamin B12 deficiency         CONTINUE these medications which have NOT CHANGED    Details   atenolol (TENORMIN) 50 MG tablet Take 50 mg by mouth daily      !! glipiZIDE (GLUCOTROL) 10 MG tablet Take 10 mg by mouth daily before breakfast      !! glipiZIDE (GLUCOTROL) 5 MG tablet Take 5 mg by mouth every evening      insulin glargine (LANTUS VIAL) 100 UNIT/ML vial Inject 20 Units Subcutaneous At Bedtime      irbesartan (AVAPRO) 300 MG tablet Take 300 mg by mouth daily      lovastatin (MEVACOR) 40 MG tablet Take 40 mg by mouth At Bedtime      metFORMIN (GLUCOPHAGE) 1000 MG tablet Take 1,000 mg by mouth 2 times daily (with meals)      tamsulosin (FLOMAX) 0.4 MG capsule Take 0.8 mg by mouth At Bedtime       !! - Potential duplicate medications found. Please discuss with provider.      STOP taking these medications       chlorthalidone (HYGROTON) 50 MG tablet Comments:   Reason for Stopping:             Allergies   Allergies   Allergen Reactions     Lisinopril Anaphylaxis and Cough     Simvastatin Anaphylaxis and Difficulty breathing

## 2020-11-15 NOTE — PLAN OF CARE
Neuro:intact, new learning recall loss  CV/Rhythm:A fib controlled  Resp/02:RA  GI/Diet:obese, mod carb  :voiding  Skin/Incisions/Sites:intact  Pulses/CMS:intact  Edema:BLE mod, FR 1.5 L  Activity/Falls Risk:walker, refused bed alarm  Lines/Drains/IVs:PIV  Labs/BGM:bgm 267, 267  IVF bolus, echo done no results yet, COVID neg 11/13  Test/Procedures:echo done this afternoon  VS/Pain:HTN, PRN hydralazine  DC Plan:? Tomorrow  Other:needs education on Fluid restriction, medications, AF

## 2020-11-15 NOTE — PLAN OF CARE
OT: pt was sitting up at EOB upon OT arrival, spouse present in room, pt waiting to be discharged to home soon, spouse stated she will be assisting him as needed. Declined OT. Pt to discharge home today, GOALS NOT MET, see discharge summary

## 2020-11-15 NOTE — DISCHARGE INSTRUCTIONS
Discharge Instructions for Atrial Fibrillation  You have been diagnosed with an abnormal heart rhythm called atrial fibrillation (AFib). This means your heart s 2 upper chambers quiver rather than squeeze the blood out in a normal pattern. This leads to an irregular and sometimes rapid heartbeat. Some people will have symptoms such as a flip-flopping heartbeat, chest pain, lightheadedness, or shortness of breath. Other people may have no symptoms at all. AFib is serious because it affects the heart s ability to fill with blood as it should. Blood clots may form. This increases the risk for stroke. Untreated, it can also lead to heart failure. AFib can be controlled. With treatment, most people lead normal lives.  Treatment options  Treatment for AFib depends on your age, symptoms, how long you have had it, and other factors. You will have a complete evaluation to find out if you have any abnormalities that caused your heart to go into AFib. This might be blocked heart arteries, heart valve problems, or a thyroid problem. Your doctor will assess your case and discuss choices with you.  Treatment choices may include:    Treating an underlying disorder that puts you at risk for atrial fibrillation. For example, correcting an abnormal thyroid or electrolyte problem, or treating a blocked heart artery.    Restoring a normal heart rhythm with an electrical shock (cardioversion) or with an antiarrhythmic medicine (chemical cardioversion)    Using medicine to control your heart rate    Preventing the risk for blood clot and stroke using blood-thinning medicines, such as aspirin or clopidogrel.    Preventing the risk of blood clot and stroke with procedures such as left atrial appendage closure. In this procedure, a small pouch in the top of your atrium where blood clots often form is blocked off. It can prevent blood clots and reduce stroke risk without the need for lifelong blood thinner (anticoagulants).    Doing  catheter ablation or a surgical maze procedure. These procedures use different methods to create scar tissue in certain areas of heart. This interrupts the abnormal electrical signals that cause AFib. This may be an option when medicines don't work, or instead of long-term medicine.    Other treatment choices may be recommended for you by your doctor.  Managing risk factors for stroke and preventing heart failure are important parts of any treatment plan for AFib.  Home care    Take your medicines exactly as directed. Don t skip doses.    Work with your doctor to find the right medicines and doses for you.    Learn to take your own pulse. Keep a record of your results. Ask your doctor which pulse rates mean that you need medical attention. Slowing your pulse is often the goal of treatment. Ask your doctor if it s OK for you to use an automatic machine to check your pulse at home. Sometimes these machines don t count the pulse correctly with AFib.    Limit your intake of coffee, tea, cola, and other drinks with caffeine. Talk with your doctor about whether you should cut out caffeine.    Don't take over-the-counter medicines that have caffeine in them. Also avoid medicines with pseudoephedrine.    Let your doctor know what medicines you take, including prescription and over-the-counter medicines, as well as any supplements. They interfere with some medicines given for AFib.    Ask your doctor about whether you can drink alcohol. Some people need to avoid alcohol to better treat AFib. If you are taking blood-thinner medicines, alcohol may interfere with them by increasing their effect.    Never take stimulants such as amphetamines or cocaine. These drugs can speed up your heart rate and trigger AFib.    Manage your weight. If you are above your ideal body weight, losing excess pounds can reduce your incidence of AFib.    Follow-up care  Follow up with your doctor, or as advised.  When to call your healthcare  provider  Call your healthcare provider right away if you have any of the following:    Weakness    Dizziness    Fainting    Fatigue    Shortness of breath    Chest pain with increased activity    A change in the usual regularity of your heartbeat, or an unusually fast heartbeat  Charmaine last reviewed this educational content on 5/1/2019 2000-2020 The "[employer]. [address] [place] [address], [place] [address]. All rights reserved. This information is not intended as a substitute for professional medical care. Always follow your healthcare professional's instructions.        Treating Pneumonia   Pneumonia is an infection of one or both of the lungs. Pneumonia:    Is often caused by either a virus, fungus, or bacteria    Can be very serious, especially in babies, young children, and older adults. It s also serious for those with other long-term health problems or weakened immune systems.    Is sometimes treated at home and sometimes in the hospital    Antibiotic medicines  Antibiotics may be prescribed for pneumonia caused by bacteria. They may be pills (oral medicines) or shots (injections). Or they may be given by IV (intravenously) into a vein. If you are taking pills at home:    Fill your prescription and start taking your medicine as soon as you can.    You will likely start to feel better in a day or 2, but don t stop taking the antibiotic.    Use a pill organizer to help you remember to take your medicine.    Let your healthcare provider know if you have side effects.    Take your medicine exactly as directed on the label. Talk with your provider or pharmacist if you have any questions.  Antiviral medicines  Antiviral medicine may be prescribed for pneumonia caused by a virus. For example, antiviral medicine may be prescribed for pneumonia caused by the flu virus. Antibiotics don't work against viruses. If you are taking antiviral medicine at home:    Fill your prescription and start taking your  medicine as soon as you can.    Talk with your provider or pharmacist about possible side effects.    Take the medicine exactly as instructed.  To relieve symptoms  There are many medicines that can help relieve symptoms of pneumonia. Some are prescription and some are over the counter.  Your healthcare provider may advise:    Acetaminophen or ibuprofen to lower your fever and to reduce headache or other pain    Cough medicine to loosen mucus or to reduce coughing  Check with your healthcare provider or pharmacist before taking any over-the-counter medicines. Some over-the-counter medicines should not be used if you have certain health conditions.  Special treatments  If you are hospitalized for pneumonia, you may have other therapies, including:    Inhaled medicines to help with breathing or chest congestion    Supplemental oxygen to increase low oxygen levels  Drink fluids and eat healthy  You should eat healthy to help your body fight the infection. Drinking a lot of fluids helps to replace fluids lost from fever and to loosen mucus in your chest.    Diet. Make healthy food choices, including fruits and vegetables, lean meats and other proteins, 100% whole grain, and low-fat or no-fat dairy products.    Fluids. Drink at least 6 to 8 tall glasses a day. Water and 100% fruit or vegetable juice are best.  Get plenty of rest and sleep  You may be more tired than normal for a while. It's important to get enough sleep at night. It s also important to rest during the day. Talk with your healthcare provider if coughing or other symptoms are interfering with your sleep.  Preventing the spread of germs  The best thing you can do to prevent spreading germs is to wash your hands often. You should:    Scrub your hands with soap and warm water for 15 to 20 seconds.    Clean in between your fingers, the backs of your hands, and around your nails.    Dry your hands on a separate towel or use paper towels.  You should  also:    Keep alcohol-based hand  nearby.    Make sure you also clean surfaces that you touch. Use a product that kills all types of germs.    Stay away from others until you are feeling better.  When to call your healthcare provider  Call your healthcare provider if you have any of these:    Symptoms get worse or new symptoms develop    Fever continues    Shortness of breath with normal daily activities    Side effects from your medicine    Increased mucus or mucus that is darker in color    Coughing gets worse    Chest pain when coughing or breathing  Call 911  Call 911if any of these occur:    Lips or fingers are bluish, purple, or gray in color    Trouble breathing or wheezing    Shortness of breath that gets worse and doesn't improve with treatment    Feeling faint or dizzy    Loss of consciousness    Trouble talking or swallowing    Feeling of doom  Charmaine last reviewed this educational content on 12/1/2019 2000-2020 [employer]. [address] [place] [id number]. All rights reserved. This information is not intended as a substitute for professional medical care. Always follow your healthcare professional's instructions.

## 2020-11-17 LAB
BACTERIA SPEC CULT: NO GROWTH
BACTERIA SPEC CULT: NO GROWTH
SPECIMEN SOURCE: NORMAL
SPECIMEN SOURCE: NORMAL